# Patient Record
Sex: FEMALE | Race: WHITE | NOT HISPANIC OR LATINO | Employment: FULL TIME | URBAN - METROPOLITAN AREA
[De-identification: names, ages, dates, MRNs, and addresses within clinical notes are randomized per-mention and may not be internally consistent; named-entity substitution may affect disease eponyms.]

---

## 2018-07-05 ENCOUNTER — OFFICE VISIT (OUTPATIENT)
Dept: FAMILY MEDICINE CLINIC | Facility: CLINIC | Age: 48
End: 2018-07-05
Payer: COMMERCIAL

## 2018-07-05 VITALS
HEIGHT: 63 IN | SYSTOLIC BLOOD PRESSURE: 110 MMHG | HEART RATE: 80 BPM | BODY MASS INDEX: 31.06 KG/M2 | WEIGHT: 175.3 LBS | RESPIRATION RATE: 18 BRPM | DIASTOLIC BLOOD PRESSURE: 80 MMHG

## 2018-07-05 DIAGNOSIS — M25.532 ACUTE PAIN OF LEFT WRIST: Primary | ICD-10-CM

## 2018-07-05 DIAGNOSIS — Z91.030 BEE STING ALLERGY: ICD-10-CM

## 2018-07-05 DIAGNOSIS — N95.1 MENOPAUSAL AND FEMALE CLIMACTERIC STATES: ICD-10-CM

## 2018-07-05 DIAGNOSIS — H00.015 HORDEOLUM EXTERNUM LEFT LOWER EYELID: ICD-10-CM

## 2018-07-05 PROCEDURE — 99213 OFFICE O/P EST LOW 20 MIN: CPT | Performed by: NURSE PRACTITIONER

## 2018-07-05 RX ORDER — EPINEPHRINE 0.3 MG/.3ML
0.3 INJECTION SUBCUTANEOUS ONCE
Qty: 0.3 ML | Refills: 0 | Status: SHIPPED | OUTPATIENT
Start: 2018-07-05 | End: 2018-07-09 | Stop reason: SDUPTHER

## 2018-07-05 RX ORDER — VARENICLINE TARTRATE 25 MG
KIT ORAL
COMMUNITY
Start: 2014-09-15

## 2018-07-05 RX ORDER — ONDANSETRON 8 MG/1
1 TABLET, ORALLY DISINTEGRATING ORAL EVERY 8 HOURS PRN
COMMUNITY
Start: 2015-01-21 | End: 2018-07-05

## 2018-07-05 RX ORDER — LORAZEPAM 0.5 MG/1
TABLET ORAL
COMMUNITY
Start: 2018-06-07 | End: 2018-07-05

## 2018-07-05 RX ORDER — ERYTHROMYCIN 5 MG/G
OINTMENT OPHTHALMIC
COMMUNITY
Start: 2015-06-08 | End: 2018-07-05

## 2018-07-05 RX ORDER — LORAZEPAM 0.5 MG/1
TABLET ORAL
COMMUNITY
Start: 2016-02-11 | End: 2018-07-05

## 2018-07-05 RX ORDER — ZOLPIDEM TARTRATE 5 MG/1
TABLET ORAL
COMMUNITY
Start: 2016-02-11 | End: 2019-04-24 | Stop reason: SDUPTHER

## 2018-07-05 RX ORDER — VARENICLINE TARTRATE 1 MG/1
TABLET, FILM COATED ORAL
COMMUNITY
Start: 2014-08-15 | End: 2018-07-05

## 2018-07-05 NOTE — LETTER
July 5, 2018     Patient: Ericka Hayes   YOB: 1970   Date of Visit: 7/5/2018       To Whom it May Concern:    Jamaica Moran is under my professional care  She was seen in my office on 7/5/2018  Please allow patient to work from home on Friday 7/6/18   If you have any questions or concerns, please don't hesitate to call           Sincerely,          Dearl BRITTANY Holland        CC: No Recipients

## 2018-07-05 NOTE — PATIENT INSTRUCTIONS
Stye   WHAT YOU NEED TO KNOW:   A stye is a lump on the edge or inside of your eyelid caused by an infection  A stye can form on your upper or lower eyelid  It usually goes away in 2 to 4 days  DISCHARGE INSTRUCTIONS:   Medicines:   · Antibiotic medicine: This is given as an ointment to put into your eye  It is used to fight an infection caused by bacteria  Use as directed  · Take your medicine as directed  Contact your healthcare provider if you think your medicine is not helping or if you have side effects  Tell him of her if you are allergic to any medicine  Keep a list of the medicines, vitamins, and herbs you take  Include the amounts, and when and why you take them  Bring the list or the pill bottles to follow-up visits  Carry your medicine list with you in case of an emergency  Follow up with your healthcare provider as directed:  Write down your questions so you remember to ask them during your visits  Self-care:   · Use warm compresses: This will help decrease swelling and pain  Wet a clean washcloth with warm water and place it on your eye for 10 to 15 minutes, 3 to 4 times each day or as directed  · Keep your hands away from your eye: This helps to prevent the spread of the infection to other parts of the eye  Wash your hands often with soap and dry with a clean towel  Do not squeeze the stye  · Do not use eye makeup:  Do not wear eye makeup while you have a stye  Eye makeup may carry bacteria and cause another stye  Throw away eye makeup and brushes used to apply the makeup  Use new eye makeup after the stye has gone away  Do not share eye makeup with others  · Prevent another stye:  Wash your face and clean your eyelashes every day  Remove eye makeup with makeup remover  This helps to completely remove eye makeup without heavy rubbing  Contact your healthcare provider if:   · You have redness and discharge around your eye, and your eye pain is getting worse      · Your vision changes  · The stye has not gone away within 7 days  · The stye comes back within a short period of time after treatment  · You have questions or concerns about your condition or care  © 2017 [employer] Information is for End User's use only and may not be sold, redistributed or otherwise used for commercial purposes  All illustrations and images included in CareNotes® are the copyrighted property of [employer]  or [employer]  The above information is an  only  It is not intended as medical advice for individual conditions or treatments  Talk to your doctor, nurse or pharmacist before following any medical regimen to see if it is safe and effective for you

## 2018-07-06 ENCOUNTER — APPOINTMENT (OUTPATIENT)
Dept: LAB | Facility: CLINIC | Age: 48
End: 2018-07-06
Payer: COMMERCIAL

## 2018-07-06 ENCOUNTER — TRANSCRIBE ORDERS (OUTPATIENT)
Dept: LAB | Facility: CLINIC | Age: 48
End: 2018-07-06

## 2018-07-06 LAB — FSH SERPL-ACNC: 58.3 MIU/ML

## 2018-07-06 PROCEDURE — 36415 COLL VENOUS BLD VENIPUNCTURE: CPT | Performed by: NURSE PRACTITIONER

## 2018-07-06 PROCEDURE — 83001 ASSAY OF GONADOTROPIN (FSH): CPT | Performed by: NURSE PRACTITIONER

## 2018-07-09 DIAGNOSIS — Z91.030 BEE STING ALLERGY: ICD-10-CM

## 2018-07-09 DIAGNOSIS — H00.015 HORDEOLUM EXTERNUM LEFT LOWER EYELID: ICD-10-CM

## 2018-07-09 RX ORDER — EPINEPHRINE 0.3 MG/.3ML
0.3 INJECTION SUBCUTANEOUS ONCE
Qty: 0.3 ML | Refills: 0 | Status: SHIPPED | OUTPATIENT
Start: 2018-07-09 | End: 2018-07-10 | Stop reason: SDUPTHER

## 2018-07-10 DIAGNOSIS — H00.015 HORDEOLUM EXTERNUM LEFT LOWER EYELID: ICD-10-CM

## 2018-07-10 DIAGNOSIS — Z91.030 BEE STING ALLERGY: ICD-10-CM

## 2018-07-10 RX ORDER — EPINEPHRINE 0.3 MG/.3ML
0.3 INJECTION SUBCUTANEOUS ONCE
Qty: 0.3 ML | Refills: 0 | Status: SHIPPED | OUTPATIENT
Start: 2018-07-10 | End: 2018-07-10

## 2018-07-12 ENCOUNTER — OFFICE VISIT (OUTPATIENT)
Dept: FAMILY MEDICINE CLINIC | Facility: CLINIC | Age: 48
End: 2018-07-12
Payer: COMMERCIAL

## 2018-07-12 VITALS
RESPIRATION RATE: 18 BRPM | WEIGHT: 175.2 LBS | SYSTOLIC BLOOD PRESSURE: 110 MMHG | DIASTOLIC BLOOD PRESSURE: 70 MMHG | HEART RATE: 78 BPM | TEMPERATURE: 98 F | BODY MASS INDEX: 31.53 KG/M2

## 2018-07-12 DIAGNOSIS — J30.1 SEASONAL ALLERGIC RHINITIS DUE TO POLLEN: Primary | ICD-10-CM

## 2018-07-12 PROCEDURE — 99213 OFFICE O/P EST LOW 20 MIN: CPT | Performed by: NURSE PRACTITIONER

## 2018-07-12 RX ORDER — FLUTICASONE PROPIONATE 50 MCG
2 SPRAY, SUSPENSION (ML) NASAL DAILY
Qty: 16 G | Refills: 0 | Status: SHIPPED | OUTPATIENT
Start: 2018-07-12 | End: 2021-02-26 | Stop reason: ALTCHOICE

## 2018-07-12 RX ORDER — CETIRIZINE HYDROCHLORIDE 10 MG/1
10 TABLET ORAL DAILY
Qty: 30 TABLET | Refills: 0 | Status: SHIPPED | OUTPATIENT
Start: 2018-07-12 | End: 2021-02-26 | Stop reason: ALTCHOICE

## 2018-07-12 NOTE — PATIENT INSTRUCTIONS
Allergic Rhinitis   WHAT YOU NEED TO KNOW:   Allergic rhinitis, or hay fever, is swelling of the inside of your nose  The swelling is a reaction to allergens in the air  An allergen can be anything that causes an allergic reaction  Allergies to weeds, grass, trees, or mold often cause seasonal allergic rhinitis  Indoor dust mites, cockroaches, pet dander, or mold can also cause allergic rhinitis  DISCHARGE INSTRUCTIONS:   Call 911 for the following:   · You have chest pain or shortness of breath  Return to the emergency department if:   · You have severe pain  · You cough up blood  Contact your healthcare provider if:   · You have a fever  · You have ear or sinus pain, or a headache  · Your symptoms get worse, even after treatment  · You have yellow, green, brown, or bloody mucus coming from your nose  · Your nose is bleeding or you have pain inside your nose  · You have trouble sleeping because of your symptoms  · You have questions or concerns about your condition or care  Medicines:   · Medicines  help decrease your symptoms and clear your stuffy nose  · Take your medicine as directed  Contact your healthcare provider if you think your medicine is not helping or if you have side effects  Tell him of her if you are allergic to any medicine  Keep a list of the medicines, vitamins, and herbs you take  Include the amounts, and when and why you take them  Bring the list or the pill bottles to follow-up visits  Carry your medicine list with you in case of an emergency  How to manage allergic rhinitis:  The best way to manage allergic rhinitis is to avoid allergens that can trigger your symptoms  Any of the following may help decrease your symptoms:  · Rinse your nose and sinuses  with a salt water solution or use a salt water nasal spray  This will help thin the mucus in your nose and rinse away pollen and dirt  It will also help reduce swelling so you can breathe normally  Ask your healthcare provider how often to rinse your nose  · Reduce exposure to dust mites  Wash sheets and towels in hot water every week  Cover your pillows and mattresses with allergen-free covers  Limit the number of stuffed animals and soft toys your child has  Wash your child's toys in hot water regularly  Vacuum weekly and use a vacuum  with an air filter  If possible, get rid of carpets and curtains  These collect dust and dust mites  · Reduce exposure to pollen  Keep windows and doors closed in your house and car  Stay inside when air pollution or the pollen count is high  Run your air conditioner on recycle, and change air filters often  Shower and wash your hair before bed every night to rinse away pollen  · Reduce exposure to pet dander  If possible, do not keep cats, dogs, birds, or other pets  If you do keep pets in your home, keep them out of bedrooms and carpeted rooms  Bathe them often  · Reduce exposure to mold  Do not spend time in basements  Choose artificial plants instead of live plants  Keep your home's humidity at less than 45%  Do not have ponds or standing water in your home or yard  · Do not smoke  Avoid others who smoke  Ask your healthcare provider for information if you currently smoke and need help to quit  Follow up with your healthcare provider as directed: You may need to see an allergist often to control your symptoms  Write down your questions so you remember to ask them during your visits  © 2017 [employer] Information is for End User's use only and may not be sold, redistributed or otherwise used for commercial purposes  All illustrations and images included in CareNotes® are the copyrighted property of [employer] , [employer]  or [employer]eves  The above information is an  only  It is not intended as medical advice for individual conditions or treatments   Talk to your doctor, nurse or pharmacist before following any medical regimen to see if it is safe and effective for you

## 2018-07-12 NOTE — PROGRESS NOTES
Assessment/Plan:    No problem-specific Assessment & Plan notes found for this encounter  Diagnoses and all orders for this visit:    Seasonal allergic rhinitis due to pollen  -     fluticasone (FLONASE) 50 mcg/act nasal spray; 2 sprays into each nostril daily 1 hour prior to bedtime  -     cetirizine (ZyrTEC) 10 mg tablet; Take 1 tablet (10 mg total) by mouth daily In the am          Subjective:   Chief Complaint   Patient presents with    Cold Like Symptoms     cough        Patient ID: Adrienne fountain a 50 y o  female  Presents today with complaints of head congestion and pressure, sore throat, and generally not feeling well for 1 day  The following portions of the patient's history were reviewed and updated as appropriate: allergies, current medications, past family history, past medical history, past social history, past surgical history and problem list     Review of Systems   Constitutional: Negative for chills and fever  HENT: Positive for congestion, sinus pressure and sore throat  Negative for ear pain, facial swelling, postnasal drip, rhinorrhea, sinus pain and sneezing  Eyes: Negative for itching  Respiratory: Positive for cough (non productive )  Negative for shortness of breath and wheezing  Cardiovascular: Negative for chest pain  Psychiatric/Behavioral: Negative for dysphoric mood  The patient is not nervous/anxious  Objective:  Vitals:    07/12/18 1451   BP: 110/70   BP Location: Right arm   Patient Position: Sitting   Cuff Size: Standard   Pulse: 78   Resp: 18   Temp: 98 °F (36 7 °C)   TempSrc: Axillary   Weight: 79 5 kg (175 lb 3 2 oz)      Physical Exam   Constitutional: She is oriented to person, place, and time  She appears well-developed and well-nourished  No distress  HENT:   Right Ear: Tympanic membrane, external ear and ear canal normal    Left Ear: Tympanic membrane, external ear and ear canal normal    Nose: Mucosal edema and rhinorrhea present  Right sinus exhibits no maxillary sinus tenderness and no frontal sinus tenderness  Left sinus exhibits no maxillary sinus tenderness and no frontal sinus tenderness  Mouth/Throat: Oropharynx is clear and moist  No oropharyngeal exudate, posterior oropharyngeal edema or posterior oropharyngeal erythema  Cardiovascular: Normal rate and regular rhythm  Pulmonary/Chest: Effort normal and breath sounds normal    Neurological: She is alert and oriented to person, place, and time  Skin: Skin is warm and dry  Psychiatric: She has a normal mood and affect   Her behavior is normal

## 2018-07-19 ENCOUNTER — OFFICE VISIT (OUTPATIENT)
Dept: FAMILY MEDICINE CLINIC | Facility: CLINIC | Age: 48
End: 2018-07-19
Payer: COMMERCIAL

## 2018-07-19 VITALS
HEIGHT: 63 IN | TEMPERATURE: 98 F | WEIGHT: 172.7 LBS | HEART RATE: 90 BPM | SYSTOLIC BLOOD PRESSURE: 110 MMHG | RESPIRATION RATE: 18 BRPM | BODY MASS INDEX: 30.6 KG/M2 | DIASTOLIC BLOOD PRESSURE: 70 MMHG

## 2018-07-19 DIAGNOSIS — Z12.31 SCREENING MAMMOGRAM, ENCOUNTER FOR: ICD-10-CM

## 2018-07-19 DIAGNOSIS — Z01.419 GYNECOLOGIC EXAM NORMAL: Primary | ICD-10-CM

## 2018-07-19 DIAGNOSIS — R32 URINARY INCONTINENCE, UNSPECIFIED TYPE: ICD-10-CM

## 2018-07-19 DIAGNOSIS — Z80.3 FAMILY HISTORY OF BREAST CANCER IN FEMALE: ICD-10-CM

## 2018-07-19 DIAGNOSIS — E55.9 VITAMIN D DEFICIENCY: ICD-10-CM

## 2018-07-19 DIAGNOSIS — N90.89 LABIAL LESION: ICD-10-CM

## 2018-07-19 DIAGNOSIS — R92.2 DENSE BREAST: ICD-10-CM

## 2018-07-19 PROCEDURE — 87624 HPV HI-RISK TYP POOLED RSLT: CPT | Performed by: NURSE PRACTITIONER

## 2018-07-19 PROCEDURE — G0145 SCR C/V CYTO,THINLAYER,RESCR: HCPCS | Performed by: NURSE PRACTITIONER

## 2018-07-19 PROCEDURE — 99396 PREV VISIT EST AGE 40-64: CPT | Performed by: NURSE PRACTITIONER

## 2018-07-19 RX ORDER — LORAZEPAM 0.5 MG/1
TABLET ORAL
COMMUNITY
Start: 2018-06-07 | End: 2019-06-05 | Stop reason: SDUPTHER

## 2018-07-19 NOTE — PROGRESS NOTES
Subjective     Anny Duncan is a 50 y o   female and is here for routine health maintenance  The patient reports problems - menopausal symptoms  History of Present Illness     HPI    Well Adult Physical   Patient here for a Gynecological exam       Diet and Physical Activity  Diet: low carbohydrate diet  Weight concerns: Patient has class 1 obesity (BMI 30-34  9)  Exercise: frequently      Depression Screen  PHQ-9 Depression Screening    PHQ-9:    Frequency of the following problems over the past two weeks:                History:  LMP: @  Menses regular  hysterectomy  Cycle Length N/A  Flow N/A  Menorrhagia  no  Dysmenorrhea   no  : 4  Para: 4  Breast Fed yes  Bottle Fed yes  Both yes   Screening  Colononoscopy N/A  Mammogram ? Pap ? Abnormal pap? no      The following portions of the patient's history were reviewed and updated as appropriate: allergies, current medications, past family history, past medical history, past social history, past surgical history and problem list     Review of Systems     Review of Systems   Constitutional: Negative for chills, fatigue and fever  Cardiovascular: Negative for leg swelling  Gastrointestinal: Negative for abdominal pain  Genitourinary: Positive for dyspareunia (occasionally)  Negative for dysuria, frequency, genital sores, menstrual problem, pelvic pain, urgency, vaginal bleeding, vaginal discharge and vaginal pain  Skin: Negative for rash  Hematological: Negative for adenopathy         Breast ROS: negative for breast lumps  Self Breast Exam yes  Genito-Urinary ROS: no dysuria, trouble voiding, or hematuria  Urinary Incontinence  Yes stress  Hot Flashes yes  Night Sweats yes  Vaginal dryness Occasionally  Insomnia no  Sexually Active yes  Birth Control Method Hysterectomy  Calcium dietary source  Vitamin D no  Weight bearing Exercise yes  Past Medical History     Past Medical History:   Diagnosis Date    Anemia     Depression Past Surgical History     Past Surgical History:   Procedure Laterality Date    BACK SURGERY  2015    mulitple     SECTION      HYSTERECTOMY      SPINAL FUSION      posterior - T10 sacrum       Social History     Social History     Social History    Marital status: /Civil Union     Spouse name: N/A    Number of children: N/A    Years of education: N/A     Social History Main Topics    Smoking status: Current Every Day Smoker     Types: Cigarettes    Smokeless tobacco: Never Used      Comment: heavy tob smoker 15 cig/day as per [employer]    Alcohol use No    Drug use: No    Sexual activity: Not Asked     Other Topics Concern    None     Social History Narrative    None       Family History     Family History   Problem Relation Age of Onset    Breast cancer Mother         sternum    Diabetes Father     Hypertension Father     Coronary artery disease Father        Current Medications       Current Outpatient Prescriptions:     LORazepam (ATIVAN) 0 5 mg tablet, take 1 tablet by oral route prior to flight and repeat in 4 hours as needed, Disp: , Rfl:     cetirizine (ZyrTEC) 10 mg tablet, Take 1 tablet (10 mg total) by mouth daily In the am, Disp: 30 tablet, Rfl: 0    EPINEPHrine (EPIPEN) 0 3 mg/0 3 mL SOAJ, Inject 0 3 mL (0 3 mg total) into a muscle once for 1 dose, Disp: 0 3 mL, Rfl: 0    fluticasone (FLONASE) 50 mcg/act nasal spray, 2 sprays into each nostril daily 1 hour prior to bedtime, Disp: 16 g, Rfl: 0    gentamicin (GENTAK) 0 3 % ophthalmic ointment, Administer 0 5 inches into the left eye 3 (three) times a day, Disp: 3 5 g, Rfl: 0    varenicline (CHANTIX STARTING MONTH ) 0 5 MG X 11 & 1 MG X 42 tablet, Take by mouth, Disp: , Rfl:     zolpidem (AMBIEN) 5 mg tablet, Reported on 2017, Disp: , Rfl:      Allergies     Allergies   Allergen Reactions    Bee Venom Hives    Bee Pollen        Objective     /70 (BP Location: Left arm, Patient Position: Sitting, Cuff Size: Standard)   Pulse 90   Temp 98 °F (36 7 °C) (Temporal)   Resp 18   Ht 5' 2 5" (1 588 m)   Wt 78 3 kg (172 lb 11 2 oz)   BMI 31 08 kg/m²      Physical Exam   Constitutional: She appears well-developed and well-nourished  Neck: No thyromegaly present  Cardiovascular: Normal rate, regular rhythm and normal heart sounds  Pulmonary/Chest: Effort normal and breath sounds normal  Right breast exhibits no inverted nipple, no mass, no nipple discharge, no skin change and no tenderness  Left breast exhibits no inverted nipple, no mass, no nipple discharge, no skin change and no tenderness  Breasts are symmetrical    Abdominal: There is no tenderness  Genitourinary: Rectum normal  No breast swelling, tenderness, discharge or bleeding  No labial fusion  There is no rash, tenderness, lesion or injury on the right labia  There is lesion (mid labia nodular and white in color) on the left labia  There is no rash, tenderness or injury on the left labia  Genitourinary Comments: Vagina Atrophic  Adnexa surgically absent  Uterus surgically absent   Lymphadenopathy:     She has no cervical adenopathy  Skin: Skin is warm and dry  Psychiatric: She has a normal mood and affect  Her behavior is normal          No exam data present    Health Maintenance     Health Maintenance   Topic Date Due    HIV SCREENING  1970    Depression Screening PHQ-9  1970    MAMMOGRAM  1970    INFLUENZA VACCINE  09/01/2018    DTaP,Tdap,and Td Vaccines (3 - Td) 01/01/2024     Immunization History   Administered Date(s) Administered    Influenza 12/03/2014, 10/19/2016, 10/04/2017, 10/11/2017    Influenza Quadrivalent, 6-35 Months IM 12/03/2014    Influenza Split 10/04/2017    Tdap 05/22/2008, 01/01/2014       Assessment/Plan     Healthy well female  Waiting on PAP/HPV results  Labial Lesion left side: apply hydrocortisone twice daily for 2 weeks   If not resolved will biopsy    BRITTANY Lares

## 2018-07-23 LAB — HPV RRNA GENITAL QL NAA+PROBE: NORMAL

## 2018-07-24 LAB
LAB AP GYN PRIMARY INTERPRETATION: NORMAL
Lab: NORMAL

## 2018-08-02 ENCOUNTER — HOSPITAL ENCOUNTER (EMERGENCY)
Facility: HOSPITAL | Age: 48
Discharge: HOME | End: 2018-08-02
Attending: EMERGENCY MEDICINE
Payer: COMMERCIAL

## 2018-08-02 ENCOUNTER — APPOINTMENT (EMERGENCY)
Dept: RADIOLOGY | Facility: HOSPITAL | Age: 48
End: 2018-08-02
Payer: COMMERCIAL

## 2018-08-02 VITALS
WEIGHT: 169 LBS | HEART RATE: 81 BPM | BODY MASS INDEX: 30.42 KG/M2 | DIASTOLIC BLOOD PRESSURE: 61 MMHG | RESPIRATION RATE: 18 BRPM | OXYGEN SATURATION: 100 % | SYSTOLIC BLOOD PRESSURE: 123 MMHG

## 2018-08-02 DIAGNOSIS — S89.92XA INJURY OF LEFT KNEE, INITIAL ENCOUNTER: Primary | ICD-10-CM

## 2018-08-02 PROCEDURE — 73564 X-RAY EXAM KNEE 4 OR MORE: CPT

## 2018-08-02 PROCEDURE — 99283 EMERGENCY DEPT VISIT LOW MDM: CPT

## 2018-08-02 RX ORDER — OXYCODONE HYDROCHLORIDE 5 MG/1
5 CAPSULE ORAL EVERY 6 HOURS PRN
Qty: 10 CAPSULE | Refills: 0 | Status: SHIPPED | OUTPATIENT
Start: 2018-08-02 | End: 2021-02-26 | Stop reason: ALTCHOICE

## 2018-08-02 RX ORDER — OXYCODONE HYDROCHLORIDE 5 MG/1
5 TABLET ORAL ONCE
Status: COMPLETED | OUTPATIENT
Start: 2018-08-02 | End: 2018-08-02

## 2018-08-02 RX ADMIN — OXYCODONE HYDROCHLORIDE 5 MG: 5 TABLET ORAL at 17:48

## 2018-08-02 NOTE — ED NOTES
Taught PT how to use crutches, PT showed me appropriately that she could use them        Hill Free  08/02/18 3151

## 2018-08-02 NOTE — DISCHARGE INSTRUCTIONS
Diagnosis; left knee injury     - knee immobolizer and crutches as needed     - intermitent ice to area  For next 24 hrs- 3-5 times per day  For 10-15 minutes at a time    - for pain- would start with over the counter generic ibuprofen 400 mg- taken together with over the counter generic tylenol 500 mg taken together  4 times per day with meals/liquids    - for severe pain- oxycodone 1 tablet every 6 hrs as needed - should only need for next 2-3 days on a as needed basis    - please call  The orthopedic doctor tomorrow to schedule an appointment for a recheck - you can see any doctor in the group

## 2018-08-02 NOTE — ED PROVIDER NOTES
History  Chief Complaint   Patient presents with    Knee Injury     Pt states she was walking down the stairs and felt a pop in her left knee, had no pain now presents w/ pain and swelling      48 YR  FEMALE STEPPED AWKWARDLY WHILE WALKING YESTERDAY WITH LEFT KNEE EXTERNAL ROTATION  WITH INITIAL PAIN BUT INCREASING PAIN THRU OUT DAY  -- C/O ANT KNEE/ LATERLA KNEE LATERAL LOWER THIGH PAIN - NO OTHER COMPS        History provided by:  Patient and spouse   used: No        Prior to Admission Medications   Prescriptions Last Dose Informant Patient Reported? Taking? EPINEPHrine (EPIPEN) 0 3 mg/0 3 mL SOAJ   No No   Sig: Inject 0 3 mL (0 3 mg total) into a muscle once for 1 dose   LORazepam (ATIVAN) 0 5 mg tablet   Yes No   Sig: take 1 tablet by oral route prior to flight and repeat in 4 hours as needed   cetirizine (ZyrTEC) 10 mg tablet   No No   Sig: Take 1 tablet (10 mg total) by mouth daily In the am   fluticasone (FLONASE) 50 mcg/act nasal spray   No No   Si sprays into each nostril daily 1 hour prior to bedtime   gentamicin (GENTAK) 0 3 % ophthalmic ointment   No No   Sig: Administer 0 5 inches into the left eye 3 (three) times a day   varenicline (CHANTIX STARTING MONTH ) 0 5 MG X 11 & 1 MG X 42 tablet   Yes No   Sig: Take by mouth   zolpidem (AMBIEN) 5 mg tablet   Yes No   Sig: Reported on 2017      Facility-Administered Medications: None       Past Medical History:   Diagnosis Date    Anemia     Depression        Past Surgical History:   Procedure Laterality Date    BACK SURGERY  2015    mulitple     SECTION      HYSTERECTOMY      SPINAL FUSION      posterior - T10 sacrum       Family History   Problem Relation Age of Onset    Breast cancer Mother         sternum    Diabetes Father     Hypertension Father     Coronary artery disease Father      I have reviewed and agree with the history as documented      Social History   Substance Use Topics    Smoking status: Current Every Day Smoker     Types: Cigarettes    Smokeless tobacco: Never Used      Comment: heavy tob smoker 15 cig/day as per [employer]    Alcohol use No        Review of Systems   Constitutional: Negative  HENT: Negative  Eyes: Negative  Respiratory: Negative  Cardiovascular: Negative  Gastrointestinal: Negative  Endocrine: Negative  Genitourinary: Negative  Musculoskeletal: Positive for gait problem  Negative for arthralgias, back pain, joint swelling, myalgias, neck pain and neck stiffness  LEFT KNEE PAIN    Skin: Negative  Allergic/Immunologic: Negative  Hematological: Negative  Psychiatric/Behavioral: Negative  Physical Exam  Physical Exam   Constitutional: She appears well-developed and well-nourished  No distress  VSS- PULSE  5 ON RA- INTERPRETATION IS NORMAL- NO INTERVENTION    Musculoskeletal:   LLE- NT AT PELVIS/ GROIN/ HIP AREA- LEFT KNEE- POS LATERAL LOWER THIGH PAIN -- DECREASED FLEX/EXT AT KNEE- PATELLAR- NT- MILD FIB HEAD TENDENRNESS- NO DEFINITE LIG LAXITY - NO EFFUSION - NORMAL LLE DISTAL PULSE-SENSATION STRENGTH/ROM- NORMAL ACHILLES TENDON FUCNTION   Skin: She is not diaphoretic  Nursing note and vitals reviewed  Vital Signs  ED Triage Vitals [08/02/18 1636]   Temp Pulse Respirations Blood Pressure SpO2   -- 81 18 123/61 100 %      Temp src Heart Rate Source Patient Position - Orthostatic VS BP Location FiO2 (%)   -- Monitor Sitting Left arm --      Pain Score       8           Vitals:    08/02/18 1636   BP: 123/61   Pulse: 81   Patient Position - Orthostatic VS: Sitting       Visual Acuity      ED Medications  Medications   oxyCODONE (ROXICODONE) IR tablet 5 mg (5 mg Oral Given 8/2/18 1748)       Diagnostic Studies  Results Reviewed     None                 XR knee 4+ vw left injury   Final Result by Ramon Monterroso MD (08/02 1653)      No acute osseous abnormality              Workstation performed: [id number] Procedures  Procedures       Phone Contacts  ED Phone Contact    ED Course  ED Course as of Aug 02 1813   Thu Aug 02, 2018   1745 Left knee xray - no evidence of fx                                 MDM  [facility] Time    Disposition  Final diagnoses:   None     ED Disposition     None      Follow-up Information    None         Patient's Medications   Discharge Prescriptions    No medications on file     No discharge procedures on file      ED Provider  Electronically Signed by           Carol Qiu MD  08/02/18 7668

## 2018-08-02 NOTE — ED NOTES
PT awake and alert, no distress noted  No other questions upon d/c       April Bhumi Jackson RN  08/02/18 4599

## 2019-04-22 ENCOUNTER — TELEPHONE (OUTPATIENT)
Dept: FAMILY MEDICINE CLINIC | Facility: CLINIC | Age: 49
End: 2019-04-22

## 2019-04-23 ENCOUNTER — TELEPHONE (OUTPATIENT)
Dept: FAMILY MEDICINE CLINIC | Facility: CLINIC | Age: 49
End: 2019-04-23

## 2019-04-23 DIAGNOSIS — G47.9 SLEEP DIFFICULTIES: Primary | ICD-10-CM

## 2019-04-23 RX ORDER — ZOLPIDEM TARTRATE 5 MG/1
5 TABLET ORAL AS NEEDED
Qty: 5 TABLET | Refills: 0 | Status: SHIPPED | OUTPATIENT
Start: 2019-04-23 | End: 2019-04-24 | Stop reason: SDUPTHER

## 2019-04-24 DIAGNOSIS — G47.9 SLEEP DIFFICULTIES: ICD-10-CM

## 2019-04-24 RX ORDER — ZOLPIDEM TARTRATE 5 MG/1
5 TABLET ORAL AS NEEDED
Qty: 5 TABLET | Refills: 0 | Status: SHIPPED | OUTPATIENT
Start: 2019-04-24 | End: 2019-10-14 | Stop reason: SDUPTHER

## 2019-06-05 DIAGNOSIS — G47.9 SLEEP DIFFICULTIES: Primary | ICD-10-CM

## 2019-06-05 RX ORDER — LORAZEPAM 0.5 MG/1
0.5 TABLET ORAL AS NEEDED
Qty: 5 TABLET | Refills: 0 | Status: SHIPPED | OUTPATIENT
Start: 2019-06-05 | End: 2019-06-06 | Stop reason: SDUPTHER

## 2019-06-06 ENCOUNTER — TELEPHONE (OUTPATIENT)
Dept: FAMILY MEDICINE CLINIC | Facility: CLINIC | Age: 49
End: 2019-06-06

## 2019-06-06 DIAGNOSIS — G47.9 SLEEP DIFFICULTIES: ICD-10-CM

## 2019-06-06 RX ORDER — LORAZEPAM 0.5 MG/1
0.5 TABLET ORAL AS NEEDED
Qty: 5 TABLET | Refills: 0 | Status: SHIPPED | OUTPATIENT
Start: 2019-06-06 | End: 2019-10-14 | Stop reason: SDUPTHER

## 2019-08-12 ENCOUNTER — TELEPHONE (OUTPATIENT)
Dept: FAMILY MEDICINE CLINIC | Facility: CLINIC | Age: 49
End: 2019-08-12

## 2019-08-12 ENCOUNTER — OFFICE VISIT (OUTPATIENT)
Dept: FAMILY MEDICINE CLINIC | Facility: CLINIC | Age: 49
End: 2019-08-12
Payer: COMMERCIAL

## 2019-08-12 VITALS
HEART RATE: 94 BPM | OXYGEN SATURATION: 94 % | DIASTOLIC BLOOD PRESSURE: 72 MMHG | SYSTOLIC BLOOD PRESSURE: 126 MMHG | BODY MASS INDEX: 29.19 KG/M2 | TEMPERATURE: 97.9 F | WEIGHT: 162.2 LBS

## 2019-08-12 DIAGNOSIS — L23.7 ALLERGIC CONTACT DERMATITIS DUE TO PLANTS, EXCEPT FOOD: Primary | ICD-10-CM

## 2019-08-12 PROCEDURE — 99214 OFFICE O/P EST MOD 30 MIN: CPT | Performed by: NURSE PRACTITIONER

## 2019-08-12 NOTE — PROGRESS NOTES
Subjective:   Chief Complaint   Patient presents with    Rash     Since Saturday        Patient ID: Ericka Hayes is a 52 y o  female  Presents today for complaints of itching red rash around eyes also on the top of her feet since Saturday  On Friday night Saturday morning they were cutting down trees on her property so she was around a great deal of brush  Itching began after she showered on Saturday afternoon and she has been using apple cider vinegar to stop the itch but nothing else  The following portions of the patient's history were reviewed and updated as appropriate: allergies, current medications, past family history, past medical history, past social history, past surgical history and problem list     Review of Systems   Constitutional: Negative for chills, fatigue and fever  Eyes: Negative for pain and visual disturbance  Respiratory: Negative for cough, shortness of breath and wheezing  Cardiovascular: Negative for chest pain, palpitations and leg swelling  Skin: Positive for rash  Psychiatric/Behavioral: Negative for dysphoric mood  The patient is not nervous/anxious  Objective:  Vitals:    08/12/19 1344   BP: 126/72   BP Location: Left arm   Patient Position: Sitting   Cuff Size: Adult   Pulse: 94   Temp: 97 9 °F (36 6 °C)   TempSrc: Temporal   SpO2: 94%   Weight: 73 6 kg (162 lb 3 2 oz)      Physical Exam   Constitutional: She appears well-developed and well-nourished  Eyes: Pupils are equal, round, and reactive to light  Conjunctivae and EOM are normal  Right eye exhibits no discharge  Left eye exhibits no discharge  Neck: Normal range of motion  Neck supple  Cardiovascular: Normal rate, regular rhythm, normal heart sounds and intact distal pulses  Pulmonary/Chest: Effort normal and breath sounds normal    Skin: Rash noted  Rash is macular  Rash is not vesicular  There is erythema           Assessment/Plan:    No problem-specific Assessment & Plan notes found for this encounter  Diagnoses and all orders for this visit:    Allergic contact dermatitis due to plants, except food  Comments:  Symptom relief to include benadryl as needed and cold compresses  Orders:  -     hydrocortisone 2 5 % ointment;  Apply topically 2 (two) times a day

## 2019-08-12 NOTE — PATIENT INSTRUCTIONS
Contact Dermatitis   AMBULATORY CARE:   Contact dermatitis  is a rash  It develops when you touch something that irritates your skin or causes an allergic reaction  Common signs and symptoms include the following:   · Red, swollen, painful rash           · Skin that itches, stings, or burns    · Dry, scaly, or crusty skin patches    · Bumps or blisters    · Fluid draining from blisters  Call 911 for any of the following:   · You have sudden trouble breathing  · Your throat swells and you have trouble eating  · Your face is swollen  Contact your healthcare provider if:   · You have a fever  · Your blisters are draining pus  · Your rash spreads or does not get better, even after treatment  · You have questions or concerns about your condition or care  Treatment for contact dermatitis  involves removing any irritants or allergens that cause your rash  You may also need medicines to decrease itching and swelling  They will be given as a topical medicine to apply to your rash or as a pill  Manage contact dermatitis:   · Take short baths or showers in cool water  Use mild soap or soap-free cleansers  Add oatmeal, baking soda, or cornstarch to the bath water to help decrease skin irritation  · Avoid skin irritants , such as makeup, hair products, soaps, and cleansers  Use products that do not contain perfume or dye  · Apply a cool compress to your rash  This will help soothe your skin  · Keep your skin moist   Rub unscented cream or lotion on your skin to prevent dryness and itching  Do this right after a bath or shower when your skin is still damp  Follow up with your healthcare provider as directed:  Write down your questions so you remember to ask them during your visits  © 2017 [employer] Information is for End User's use only and may not be sold, redistributed or otherwise used for commercial purposes   All illustrations and images included in CareNotes® are the copyrighted property of [employer]  or Lakhwinder Esteves  The above information is an  only  It is not intended as medical advice for individual conditions or treatments  Talk to your doctor, nurse or pharmacist before following any medical regimen to see if it is safe and effective for you

## 2019-08-12 NOTE — TELEPHONE ENCOUNTER
Pt called stating she has poison ivy around her eyes and chin  She is looking for an appointment for today  She had this since Friday

## 2019-10-12 DIAGNOSIS — G47.9 SLEEP DIFFICULTIES: ICD-10-CM

## 2019-10-12 RX ORDER — ZOLPIDEM TARTRATE 5 MG/1
5 TABLET ORAL AS NEEDED
Qty: 5 TABLET | Refills: 0 | Status: CANCELLED | OUTPATIENT
Start: 2019-10-12

## 2019-10-12 NOTE — TELEPHONE ENCOUNTER
I will be traveling on Tuesday by plane  If I could please have a prescription called into the [facility] in [place], I would appreciate it   Thanks

## 2019-10-14 DIAGNOSIS — G47.9 SLEEP DIFFICULTIES: ICD-10-CM

## 2019-10-14 RX ORDER — LORAZEPAM 0.5 MG/1
0.5 TABLET ORAL AS NEEDED
Qty: 5 TABLET | Refills: 0 | Status: SHIPPED | OUTPATIENT
Start: 2019-10-14 | End: 2021-02-26 | Stop reason: ALTCHOICE

## 2019-10-14 RX ORDER — ZOLPIDEM TARTRATE 5 MG/1
5 TABLET ORAL AS NEEDED
Qty: 5 TABLET | Refills: 0 | Status: SHIPPED | OUTPATIENT
Start: 2019-10-14 | End: 2021-02-26 | Stop reason: ALTCHOICE

## 2020-04-16 ENCOUNTER — TELEPHONE (OUTPATIENT)
Dept: FAMILY MEDICINE CLINIC | Facility: CLINIC | Age: 50
End: 2020-04-16

## 2020-04-23 ENCOUNTER — TELEPHONE (OUTPATIENT)
Dept: FAMILY MEDICINE CLINIC | Facility: CLINIC | Age: 50
End: 2020-04-23

## 2020-08-03 ENCOUNTER — OFFICE VISIT (OUTPATIENT)
Dept: FAMILY MEDICINE CLINIC | Facility: CLINIC | Age: 50
End: 2020-08-03
Payer: COMMERCIAL

## 2020-08-03 VITALS
RESPIRATION RATE: 16 BRPM | WEIGHT: 153.5 LBS | HEIGHT: 63 IN | HEART RATE: 90 BPM | OXYGEN SATURATION: 96 % | BODY MASS INDEX: 27.2 KG/M2 | SYSTOLIC BLOOD PRESSURE: 118 MMHG | TEMPERATURE: 97.8 F | DIASTOLIC BLOOD PRESSURE: 60 MMHG

## 2020-08-03 DIAGNOSIS — L23.7 POISON IVY DERMATITIS: Primary | ICD-10-CM

## 2020-08-03 PROCEDURE — 3008F BODY MASS INDEX DOCD: CPT | Performed by: NURSE PRACTITIONER

## 2020-08-03 PROCEDURE — 3725F SCREEN DEPRESSION PERFORMED: CPT | Performed by: NURSE PRACTITIONER

## 2020-08-03 PROCEDURE — 99214 OFFICE O/P EST MOD 30 MIN: CPT | Performed by: NURSE PRACTITIONER

## 2020-08-03 RX ORDER — PREDNISONE 20 MG/1
20 TABLET ORAL DAILY
Qty: 5 TABLET | Refills: 0 | Status: SHIPPED | OUTPATIENT
Start: 2020-08-03 | End: 2020-08-08

## 2020-08-03 NOTE — PATIENT INSTRUCTIONS
Poison Ivy   WHAT YOU NEED TO KNOW:   Poison ivy is a plant that can cause an itchy, uncomfortable rash on your skin  Poison ivy grows as a shrub or vine in woods, fields, and areas of thick [place]  It has 3 bright green leaves on each stem that turn red in terrance  DISCHARGE INSTRUCTIONS:   Medicines:   · Antiseptic or drying creams or ointments: These medicines may be used to dry out the rash and decrease the itching  These products may be available without a doctor's order  · Steroids: This medicine helps decrease itching and inflammation  It can be given as a cream to apply to your skin or as a pill  · Antihistamines: This medicine may help decrease itching and help you sleep  It is available without a doctor's order  · Take your medicine as directed  Contact your healthcare provider if you think your medicine is not helping or if you have side effects  Tell him of her if you are allergic to any medicine  Keep a list of the medicines, vitamins, and herbs you take  Include the amounts, and when and why you take them  Bring the list or the pill bottles to follow-up visits  Carry your medicine list with you in case of an emergency  Follow up with your healthcare provider as directed:  Write down your questions so you remember to ask them during your visits  How your poison ivy rash spreads: You cannot spread poison ivy by touching your rash or the liquid from your blisters  Poison ivy is spread only if you scratch your skin while it still has oil on it  You may think your rash is spreading because new rashes appear over a number of days  This happens because areas covered by thin skin break out in a rash first  Your face or forearms may develop a rash before thicker areas, such as the palms of your hands  Self-care:   · Keep your rash clean and dry:  Wash it with soap and water  Gently pat it dry with a clean towel  · Try not to scratch or rub your rash:   This can cause your skin to become infected  · Use a compress on your rash:  Dip a clean washcloth in cool water  Wring it out and place it on your rash  Leave the washcloth on your skin for 15 minutes  Do this at least 3 times per day  · Take a cornstarch or oatmeal bath: If your rash is too large to cover with wet washcloths, take 3 or 4 cornstarch baths daily  Mix 1 pound of cornstarch with a little water to make a paste  Add the paste to a tub full of water and mix well  You may also use colloidal oatmeal in the bath water  Use lukewarm water  Avoid hot water because it may cause your itching to increase  Prevent a poison ivy rash in the future:   · Wear skin protection:  Wear long pants, a long-sleeved shirt, and gloves  Use a skin block lotion to protect your skin from poison ivy oil  You can find this at a drugstore without a prescription  · Wash clothing after possible exposure: If you think you have been near a poison ivy plant, wash the clothes you were wearing separately from other clothes  Rinse the washing machine well after you take the clothes out  Scrub boots and shoes with warm, soapy water  Dry clean items and clothing that you cannot wash in water  Poison ivy oil is sticky and can stay on surfaces for a long time  It can cause a new rash even years later  · Bathe your pet:  Use warm water and shampoo on your pet's fur  This will prevent the spread of oil to your skin, car, and home  Wear long sleeves, long pants, and gloves while washing pets or any items that may have oil on them  · Reduce exposure to poison ivy:  Do not touch plants that look like poison ivy  Keep your yard free of poison ivy  While protecting your skin, remove the plant and the roots  Place them in a plastic bag and seal the bag tightly  · Do not burn poison ivy plants: This can spread the oil through the air  If you breathe the oil into your lungs, you could have swelling and serious breathing problems   Oil that clings to the fire otto can land on your skin and cause a rash  Contact your healthcare provider if:   · You have pus, soft yellow scabs, or tenderness on the rash  · The itching gets worse or keeps you awake at night  · The rash covers more than 1/4 of your skin or spreads to your eyes, mouth, or genital area  · The rash is not better after 2 to 3 weeks  · You have tender, swollen glands on the sides of your neck  · You have swelling in your arms and legs  · You have questions or concerns about your condition or care  Return to the emergency department if:   · You have a fever  · You have redness, swelling, and tenderness around the rash  · You have trouble breathing  © 2017 [facility] Information is for End User's use only and may not be sold, redistributed or otherwise used for commercial purposes  All illustrations and images included in CareNotes® are the copyrighted property of [employer]  or Lakhwinder Esteves  The above information is an  only  It is not intended as medical advice for individual conditions or treatments  Talk to your doctor, nurse or pharmacist before following any medical regimen to see if it is safe and effective for you

## 2020-08-03 NOTE — PROGRESS NOTES
Subjective:   No chief complaint on file  Patient ID: Venita Justice is a 48 y o  female  Presents today for complaints of a poison ivy rash since Friday July 31st   She was out in the garden it began on the side of her face and has now she has patches on her abdomen, hands, chin legs and thigh  She has been doing Benadryl as well as hydrocortisone over-the-counter without relief      The following portions of the patient's history were reviewed and updated as appropriate: allergies, current medications, past family history, past medical history, past social history, past surgical history and problem list     Review of Systems   Constitutional: Negative for chills, fatigue and fever  Respiratory: Negative for cough and shortness of breath  Cardiovascular: Negative for chest pain, palpitations and leg swelling  Skin: Positive for rash (posion ivy)  Psychiatric/Behavioral: Negative for dysphoric mood  The patient is not nervous/anxious  Objective:  Vitals:    08/03/20 1606   BP: 118/60   BP Location: Left arm   Patient Position: Sitting   Cuff Size: Adult   Pulse: 90   Resp: 16   Temp: 97 8 °F (36 6 °C)   TempSrc: Tympanic   SpO2: 96%   Weight: 69 6 kg (153 lb 8 oz)   Height: 5' 2 5"      Physical Exam   Constitutional: She is oriented to person, place, and time  Eyes: Pupils are equal, round, and reactive to light  Conjunctivae are normal    Cardiovascular: Normal rate, regular rhythm, normal heart sounds and normal pulses  Pulmonary/Chest: Effort normal and breath sounds normal  She has no wheezes  Abdominal: Normal appearance  Neurological: She is alert and oriented to person, place, and time  Skin: Rash noted  Rash is vesicular  Poison ivy rash on face, chest, left arm, left thigh and abdomen   Psychiatric: Her behavior is normal  Mood normal          Assessment/Plan:    No problem-specific Assessment & Plan notes found for this encounter         Diagnoses and all orders for this visit:    Poison ivy dermatitis  Comments:  Continue with hydrocortisone ointment as needed  Orders:  -     predniSONE 20 mg tablet;  Take 1 tablet (20 mg total) by mouth daily for 5 days

## 2020-08-15 ENCOUNTER — HOSPITAL ENCOUNTER (EMERGENCY)
Facility: HOSPITAL | Age: 50
Discharge: HOME | End: 2020-08-15
Attending: EMERGENCY MEDICINE | Admitting: EMERGENCY MEDICINE
Payer: COMMERCIAL

## 2020-08-15 ENCOUNTER — NURSE TRIAGE (OUTPATIENT)
Dept: OTHER | Facility: OTHER | Age: 50
End: 2020-08-15

## 2020-08-15 VITALS
DIASTOLIC BLOOD PRESSURE: 53 MMHG | TEMPERATURE: 98.6 F | OXYGEN SATURATION: 94 % | SYSTOLIC BLOOD PRESSURE: 115 MMHG | WEIGHT: 155.65 LBS | HEART RATE: 86 BPM | RESPIRATION RATE: 18 BRPM | BODY MASS INDEX: 28.01 KG/M2

## 2020-08-15 DIAGNOSIS — T78.40XA ALLERGIC REACTION, INITIAL ENCOUNTER: Primary | ICD-10-CM

## 2020-08-15 PROCEDURE — 99283 EMERGENCY DEPT VISIT LOW MDM: CPT

## 2020-08-15 PROCEDURE — 99284 EMERGENCY DEPT VISIT MOD MDM: CPT | Performed by: EMERGENCY MEDICINE

## 2020-08-15 RX ORDER — EPINEPHRINE 0.3 MG/.3ML
0.3 INJECTION SUBCUTANEOUS ONCE
Qty: 0.6 ML | Refills: 0 | Status: SHIPPED | OUTPATIENT
Start: 2020-08-15 | End: 2022-07-13 | Stop reason: SDUPTHER

## 2020-08-15 RX ORDER — FAMOTIDINE 20 MG/1
20 TABLET, FILM COATED ORAL ONCE
Status: COMPLETED | OUTPATIENT
Start: 2020-08-15 | End: 2020-08-15

## 2020-08-15 RX ORDER — FAMOTIDINE 20 MG/1
20 TABLET, FILM COATED ORAL 2 TIMES DAILY
Qty: 30 TABLET | Refills: 0 | Status: SHIPPED | OUTPATIENT
Start: 2020-08-15 | End: 2021-02-26 | Stop reason: ALTCHOICE

## 2020-08-15 RX ORDER — PREDNISONE 20 MG/1
60 TABLET ORAL ONCE
Status: COMPLETED | OUTPATIENT
Start: 2020-08-15 | End: 2020-08-15

## 2020-08-15 RX ORDER — PREDNISONE 10 MG/1
10 TABLET ORAL DAILY
Qty: 63 TABLET | Refills: 0 | Status: SHIPPED | OUTPATIENT
Start: 2020-08-15 | End: 2021-02-26 | Stop reason: ALTCHOICE

## 2020-08-15 RX ADMIN — FAMOTIDINE 20 MG: 20 TABLET, FILM COATED ORAL at 21:45

## 2020-08-15 RX ADMIN — PREDNISONE 60 MG: 20 TABLET ORAL at 21:45

## 2020-08-15 NOTE — TELEPHONE ENCOUNTER
Regarding: EpiPen Needed - Bee Sting  ----- Message from Edwar Dominguez sent at 8/15/2020  6:47 PM EDT -----  "I just got stung by a pee and needed to use an EpiPen   I think I need another one to take down the rest of the swelling "

## 2020-08-15 NOTE — TELEPHONE ENCOUNTER
Reason for Disposition   [1] Gave epinephrine shot AND [2] no symptoms now    Answer Assessment - Initial Assessment Questions  1  TYPE: "What type of sting was it?" (bee, yellow jacket, etc )       Bee  2  ONSET: "When did it occur?"       30 minutes ago  3  LOCATION: "Where is the sting located?"  "How many stings?"      Left Foot  4  SWELLING SIZE: "How big is the swelling?" (e g , inches or cm)      Double the size of the right foot  5  REDNESS: "Is the area red or pink?" If so, ask "What size is area of redness?" (e g , inches or cm)  "When did the redness start?"      Pink  6  PAIN: "Is there any pain?" If so, ask: "How bad is it?"  (Scale 1-10; or mild, moderate, severe)      Moderate  7  ITCHING: "Is there any itching?" If so, ask: "How bad is it?"       N/A  8  RESPIRATORY DISTRESS: "Describe your breathing "      No  9  PRIOR REACTIONS: "Have you had any severe allergic reactions to stings in the past?" if yes, ask: "What happened?"      Yes hives  10  OTHER SYMPTOMS: "Do you have any other symptoms?" (e g , abdominal pain, face or tongue swelling, new rash elsewhere, vomiting)        No  11   PREGNANCY: "Is there any chance you are pregnant?" "When was your last menstrual period?"        N/A    Protocols used: BEE OR YELLOW JACKET STING-ADULT-
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
## 2020-09-04 ENCOUNTER — TELEPHONE (OUTPATIENT)
Dept: FAMILY MEDICINE CLINIC | Facility: CLINIC | Age: 50
End: 2020-09-04

## 2020-09-04 DIAGNOSIS — Z98.1 S/P SPINAL FUSION: Primary | ICD-10-CM

## 2020-09-04 RX ORDER — AMOXICILLIN 500 MG/1
CAPSULE ORAL
Qty: 4 CAPSULE | Refills: 0 | Status: SHIPPED | OUTPATIENT
Start: 2020-09-04 | End: 2020-09-07

## 2020-09-04 NOTE — TELEPHONE ENCOUNTER
Patient is calling because she needs antibiotics before any dental procedure  She is having dental work Tuesday, requesting a prescription be sent to [facility] [place]  Please advise  Patient would like a call back to confirm it was sent

## 2020-09-04 NOTE — TELEPHONE ENCOUNTER
Patient said she takes usually takes 4 pills an hour before the procedure, she thinks it's amoxicillin, because of her spinal fusion  Her Dr  at [facility] said it needs to be every time she has dental work but they can't call it in because they are in [place]

## 2020-09-04 NOTE — TELEPHONE ENCOUNTER
I see not record of us doing this before    Why does she need it? What condition? Does she usually take Penicillin?

## 2021-01-18 RX ORDER — TAMSULOSIN HYDROCHLORIDE 0.4 MG/1
0.4 CAPSULE ORAL
COMMUNITY
Start: 2020-05-20 | End: 2021-05-20

## 2021-01-18 RX ORDER — KETOROLAC TROMETHAMINE 10 MG/1
10 TABLET, FILM COATED ORAL EVERY 6 HOURS PRN
COMMUNITY
Start: 2020-04-28 | End: 2021-04-28

## 2021-02-17 ENCOUNTER — TELEPHONE (OUTPATIENT)
Dept: FAMILY MEDICINE CLINIC | Facility: CLINIC | Age: 51
End: 2021-02-17

## 2021-02-26 ENCOUNTER — OFFICE VISIT (OUTPATIENT)
Dept: FAMILY MEDICINE CLINIC | Facility: CLINIC | Age: 51
End: 2021-02-26
Payer: COMMERCIAL

## 2021-02-26 VITALS
HEART RATE: 105 BPM | HEIGHT: 63 IN | TEMPERATURE: 97.3 F | DIASTOLIC BLOOD PRESSURE: 80 MMHG | WEIGHT: 167.6 LBS | BODY MASS INDEX: 29.7 KG/M2 | SYSTOLIC BLOOD PRESSURE: 118 MMHG | OXYGEN SATURATION: 97 %

## 2021-02-26 DIAGNOSIS — Z00.00 ANNUAL PHYSICAL EXAM: Primary | ICD-10-CM

## 2021-02-26 DIAGNOSIS — Z11.4 SCREENING FOR HIV (HUMAN IMMUNODEFICIENCY VIRUS): ICD-10-CM

## 2021-02-26 DIAGNOSIS — M65.341 TRIGGER RING FINGER OF RIGHT HAND: ICD-10-CM

## 2021-02-26 DIAGNOSIS — M77.8 TENDONITIS OF ELBOW, RIGHT: ICD-10-CM

## 2021-02-26 DIAGNOSIS — Z12.31 ENCOUNTER FOR SCREENING MAMMOGRAM FOR MALIGNANT NEOPLASM OF BREAST: ICD-10-CM

## 2021-02-26 DIAGNOSIS — Z23 ENCOUNTER FOR IMMUNIZATION: ICD-10-CM

## 2021-02-26 DIAGNOSIS — Z12.11 SCREENING FOR COLORECTAL CANCER: ICD-10-CM

## 2021-02-26 DIAGNOSIS — Z12.12 SCREENING FOR COLORECTAL CANCER: ICD-10-CM

## 2021-02-26 DIAGNOSIS — G47.00 INSOMNIA, UNSPECIFIED TYPE: ICD-10-CM

## 2021-02-26 LAB
ALBUMIN SERPL BCP-MCNC: 4.1 G/DL (ref 3.5–5)
ALP SERPL-CCNC: 73 U/L (ref 46–116)
ALT SERPL W P-5'-P-CCNC: 30 U/L (ref 12–78)
ANION GAP SERPL CALCULATED.3IONS-SCNC: 5 MMOL/L (ref 4–13)
AST SERPL W P-5'-P-CCNC: 13 U/L (ref 5–45)
BASOPHILS # BLD AUTO: 0.09 THOUSANDS/ΜL (ref 0–0.1)
BASOPHILS NFR BLD AUTO: 1 % (ref 0–1)
BILIRUB SERPL-MCNC: 0.5 MG/DL (ref 0.2–1)
BUN SERPL-MCNC: 15 MG/DL (ref 5–25)
CALCIUM SERPL-MCNC: 10.2 MG/DL (ref 8.3–10.1)
CHLORIDE SERPL-SCNC: 111 MMOL/L (ref 100–108)
CHOLEST SERPL-MCNC: 254 MG/DL (ref 50–200)
CO2 SERPL-SCNC: 26 MMOL/L (ref 21–32)
CREAT SERPL-MCNC: 0.58 MG/DL (ref 0.6–1.3)
EOSINOPHIL # BLD AUTO: 0.18 THOUSAND/ΜL (ref 0–0.61)
EOSINOPHIL NFR BLD AUTO: 2 % (ref 0–6)
ERYTHROCYTE [DISTWIDTH] IN BLOOD BY AUTOMATED COUNT: 12.1 % (ref 11.6–15.1)
GFR SERPL CREATININE-BSD FRML MDRD: 108 ML/MIN/1.73SQ M
GLUCOSE P FAST SERPL-MCNC: 85 MG/DL (ref 65–99)
HCT VFR BLD AUTO: 49.5 % (ref 34.8–46.1)
HDLC SERPL-MCNC: 54 MG/DL
HGB BLD-MCNC: 16.4 G/DL (ref 11.5–15.4)
IMM GRANULOCYTES # BLD AUTO: 0.03 THOUSAND/UL (ref 0–0.2)
IMM GRANULOCYTES NFR BLD AUTO: 0 % (ref 0–2)
LDLC SERPL CALC-MCNC: 169 MG/DL (ref 0–100)
LYMPHOCYTES # BLD AUTO: 2.45 THOUSANDS/ΜL (ref 0.6–4.47)
LYMPHOCYTES NFR BLD AUTO: 29 % (ref 14–44)
MCH RBC QN AUTO: 30.8 PG (ref 26.8–34.3)
MCHC RBC AUTO-ENTMCNC: 33.1 G/DL (ref 31.4–37.4)
MCV RBC AUTO: 93 FL (ref 82–98)
MONOCYTES # BLD AUTO: 0.66 THOUSAND/ΜL (ref 0.17–1.22)
MONOCYTES NFR BLD AUTO: 8 % (ref 4–12)
NEUTROPHILS # BLD AUTO: 5.06 THOUSANDS/ΜL (ref 1.85–7.62)
NEUTS SEG NFR BLD AUTO: 60 % (ref 43–75)
NONHDLC SERPL-MCNC: 200 MG/DL
NRBC BLD AUTO-RTO: 0 /100 WBCS
PLATELET # BLD AUTO: 281 THOUSANDS/UL (ref 149–390)
PMV BLD AUTO: 10.2 FL (ref 8.9–12.7)
POTASSIUM SERPL-SCNC: 4.1 MMOL/L (ref 3.5–5.3)
PROT SERPL-MCNC: 7.8 G/DL (ref 6.4–8.2)
RBC # BLD AUTO: 5.32 MILLION/UL (ref 3.81–5.12)
SODIUM SERPL-SCNC: 142 MMOL/L (ref 136–145)
TRIGL SERPL-MCNC: 156 MG/DL
TSH SERPL DL<=0.05 MIU/L-ACNC: 0.97 UIU/ML (ref 0.36–3.74)
WBC # BLD AUTO: 8.47 THOUSAND/UL (ref 4.31–10.16)

## 2021-02-26 PROCEDURE — 80053 COMPREHEN METABOLIC PANEL: CPT | Performed by: NURSE PRACTITIONER

## 2021-02-26 PROCEDURE — 99396 PREV VISIT EST AGE 40-64: CPT | Performed by: NURSE PRACTITIONER

## 2021-02-26 PROCEDURE — 87389 HIV-1 AG W/HIV-1&-2 AB AG IA: CPT | Performed by: NURSE PRACTITIONER

## 2021-02-26 PROCEDURE — 36415 COLL VENOUS BLD VENIPUNCTURE: CPT | Performed by: NURSE PRACTITIONER

## 2021-02-26 PROCEDURE — 85025 COMPLETE CBC W/AUTO DIFF WBC: CPT | Performed by: NURSE PRACTITIONER

## 2021-02-26 PROCEDURE — 84443 ASSAY THYROID STIM HORMONE: CPT | Performed by: NURSE PRACTITIONER

## 2021-02-26 PROCEDURE — 80061 LIPID PANEL: CPT | Performed by: NURSE PRACTITIONER

## 2021-02-26 PROCEDURE — 3725F SCREEN DEPRESSION PERFORMED: CPT | Performed by: NURSE PRACTITIONER

## 2021-02-26 RX ORDER — TRAZODONE HYDROCHLORIDE 50 MG/1
50 TABLET ORAL
Qty: 30 TABLET | Refills: 1 | Status: SHIPPED | OUTPATIENT
Start: 2021-02-26 | End: 2021-04-26

## 2021-02-26 RX ORDER — NAPROXEN 500 MG/1
500 TABLET ORAL 2 TIMES DAILY WITH MEALS
Qty: 20 TABLET | Refills: 0 | Status: SHIPPED | OUTPATIENT
Start: 2021-02-26

## 2021-02-26 NOTE — PATIENT INSTRUCTIONS

## 2021-02-26 NOTE — PROGRESS NOTES
BMI Counseling: Body mass index is 30 17 kg/m²  The BMI is above normal  Nutrition recommendations include reducing portion sizes, decreasing overall calorie intake, 3-5 servings of fruits/vegetables daily, reducing fast food intake, consuming healthier snacks, decreasing soda and/or juice intake, moderation in carbohydrate intake, increasing intake of lean protein, reducing intake of saturated fat and trans fat and reducing intake of cholesterol  Exercise recommendations include moderate aerobic physical activity for 150 minutes/week, exercising 3-5 times per week and joining a gym  [address] [employer]    NAME: David Bhandari  AGE: 48 y o  SEX: female  : 1970     DATE: 2021     Assessment and Plan:     Problem List Items Addressed This Visit     None      Visit Diagnoses     Annual physical exam    -  Primary    Relevant Orders    CBC and differential    Comprehensive metabolic panel    TSH, 3rd generation with Free T4 reflex    Lipid panel    Encounter for immunization        Screening for HIV (human immunodeficiency virus)        Relevant Orders    HIV 1/2 Antigen/Antibody (4th Generation) w Reflex [facility]    Screening for colorectal cancer        Relevant Orders    Ambulatory referral to Gastroenterology    Encounter for screening mammogram for malignant neoplasm of breast        Relevant Orders    Mammo screening bilateral w 3d & cad    BMI 30 0-30 9,adult        Trigger ring finger of right hand        Tendonitis of elbow, right        Relevant Medications    naproxen (NAPROSYN) 500 mg tablet    Insomnia, unspecified type        Relevant Medications    traZODone (DESYREL) 50 mg tablet          Immunizations and preventive care screenings were discussed with patient today  Appropriate education was printed on patient's after visit summary      Counseling:  Alcohol/drug use: discussed moderation in alcohol intake, the recommendations for healthy alcohol use, and avoidance of illicit drug use  Dental Health: discussed importance of regular tooth brushing, flossing, and dental visits  Sexual health: discussed sexually transmitted diseases, partner selection, use of condoms, avoidance of unintended pregnancy, and contraceptive alternatives  · Exercise: the importance of regular exercise/physical activity was discussed  Recommend exercise 3-5 times per week for at least 30 minutes  Tobacco Cessation Counseling: Tobacco cessation counseling was not provided  The patient is sincerely urged to quit consumption of tobacco  She is not ready to quit tobacco        Return in about 1 year (around 2/26/2022), or trigger finger injection wiht Dr Aysha Holder, for Annual physical      Chief Complaint:     Chief Complaint   Patient presents with    Physical Exam    Elbow Pain     right x 1 month    Hand Pain     right ring finger, x 4months, "locking up" several times a day      History of Present Illness:     Adult Annual Physical   Patient here for a comprehensive physical exam  The patient reports problems - Pain in right hand with ring finger locking from digging  Right elbow pain and radiates down forearmand up slightly    Not sleeping well   very stressed     Issues in personal life       Diet and Physical Activity  · Diet/Nutrition: limited fruits/vegetables and not eating well    Goes most of day and eatin g at night late   · Exercise: no formal exercise  Depression Screening  PHQ-9 Depression Screening    PHQ-9:   Frequency of the following problems over the past two weeks:      Little interest or pleasure in doing things: 0 - not at all  Feeling down, depressed, or hopeless: 0 - not at all  PHQ-2 Score: 0       General Health  · Sleep: sleeps poorly  · Hearing: normal - bilateral   · Vision: no vision problems  · Dental: regular dental visits         /GYN Health  · Patient is: had hysterectomy     Having hot flashes  ·   ·   Review of Systems:     Review of Systems   Constitutional: Positive for activity change  Negative for appetite change, fatigue and fever  HENT: Negative for congestion, sinus pain, sore throat and tinnitus  Respiratory: Negative for cough and shortness of breath  Cardiovascular: Negative for chest pain  Gastrointestinal: Negative for abdominal pain, constipation, diarrhea and nausea  Genitourinary: Negative for frequency and urgency  Musculoskeletal: Negative for back pain and myalgias  Pain in right elbow and ring finger in right hand      Skin: Negative for rash  Neurological: Negative for dizziness, light-headedness, numbness and headaches  Psychiatric/Behavioral: Positive for sleep disturbance  The patient is nervous/anxious  Past Medical History:     Past Medical History:   Diagnosis Date    Anemia     Depression       Past Surgical History:     Past Surgical History:   Procedure Laterality Date    BACK SURGERY  2015    mulitple     SECTION      HYSTERECTOMY      SPINAL FUSION      posterior - T10 sacrum      Social History:     E-Cigarette/Vaping    E-Cigarette Use Never User      E-Cigarette/Vaping Substances    Nicotine No     THC No     CBD No     Flavoring No     Other No     Unknown No      Social History     Socioeconomic History    Marital status: /Civil Union     Spouse name: None    Number of children: None    Years of education: None    Highest education level: None   Occupational History    None   Social Needs    Financial resource strain: None    Food insecurity     Worry: None     Inability: None    Transportation needs     Medical: None     Non-medical: None   Tobacco Use    Smoking status: Current Every Day Smoker     Types: Cigarettes    Smokeless tobacco: Never Used    Tobacco comment: heavy tob smoker 15 cig/day as per [employer]   Substance and Sexual Activity    Alcohol use: No    Drug use:  No  Sexual activity: None   Lifestyle    Physical activity     Days per week: None     Minutes per session: None    Stress: None   Relationships    Social connections     Talks on phone: None     Gets together: None     Attends [employer] service: None     Active member of club or organization: None     Attends meetings of clubs or organizations: None     Relationship status: None    Intimate partner violence     Fear of current or ex partner: None     Emotionally abused: None     Physically abused: None     Forced sexual activity: None   Other Topics Concern    None   Social History Narrative    None      Family History:     Family History   Problem Relation Age of Onset    Breast cancer Mother         sternum    Diabetes Father     Hypertension Father     Coronary artery disease Father       Current Medications:     Current Outpatient Medications   Medication Sig Dispense Refill    EPINEPHrine (EPIPEN) 0 3 mg/0 3 mL SOAJ Inject 0 3 mL (0 3 mg total) into a muscle once for 1 dose 0 6 mL 0    erythromycin (ILOTYCIN) ophthalmic ointment Reported on 7/5/2017      ketorolac (TORADOL) 10 mg tablet Take 10 mg by mouth every 6 (six) hours as needed      naproxen (NAPROSYN) 500 mg tablet Take 1 tablet (500 mg total) by mouth 2 (two) times a day with meals 20 tablet 0    tamsulosin (FLOMAX) 0 4 mg Take 0 4 mg by mouth      traZODone (DESYREL) 50 mg tablet Take 1 tablet (50 mg total) by mouth daily at bedtime 30 tablet 1    varenicline (CHANTIX STARTING MONTH PAK) 0 5 MG X 11 & 1 MG X 42 tablet Take by mouth       No current facility-administered medications for this visit  Allergies:      Allergies   Allergen Reactions    Bee Venom Hives    Bee Pollen       Physical Exam:     /80 (BP Location: Left arm, Patient Position: Sitting, Cuff Size: Large)   Pulse 105   Temp (!) 97 3 °F (36 3 °C) (Temporal)   Ht 5' 2 5" (1 588 m)   Wt 76 kg (167 lb 9 6 oz)   SpO2 97%   BMI 30 17 kg/m²     Physical Exam  Vitals signs reviewed  Constitutional:       Appearance: Normal appearance  She is normal weight  HENT:      Right Ear: Tympanic membrane, ear canal and external ear normal       Left Ear: Tympanic membrane, ear canal and external ear normal    Eyes:      Conjunctiva/sclera: Conjunctivae normal    Neck:      Musculoskeletal: Normal range of motion and neck supple  Cardiovascular:      Rate and Rhythm: Normal rate and regular rhythm  Heart sounds: Normal heart sounds  Pulmonary:      Effort: Pulmonary effort is normal       Breath sounds: Normal breath sounds  Abdominal:      General: Bowel sounds are normal       Palpations: Abdomen is soft  Musculoskeletal: Normal range of motion  Skin:     General: Skin is warm  Capillary Refill: Capillary refill takes less than 2 seconds  Neurological:      Mental Status: She is alert and oriented to person, place, and time  Psychiatric:         Mood and Affect: Mood normal          Behavior: Behavior normal          Thought Content:  Thought content normal          Judgment: Judgment normal           BRITTANY Lindquist  [address]

## 2021-02-28 LAB — HIV 1+2 AB+HIV1 P24 AG SERPL QL IA: NORMAL

## 2021-03-01 DIAGNOSIS — Z12.11 SPECIAL SCREENING FOR MALIGNANT NEOPLASMS, COLON: Primary | ICD-10-CM

## 2021-03-01 NOTE — TELEPHONE ENCOUNTER
Pt passed crc screening  Colonoscopy scheduled 04/01/21 @[facility] with Dr Ian Spencer    Pt has fear of anesthesia

## 2021-03-02 RX ORDER — SODIUM, POTASSIUM,MAG SULFATES 17.5-3.13G
SOLUTION, RECONSTITUTED, ORAL ORAL
Qty: 2 BOTTLE | Refills: 0 | Status: SHIPPED | OUTPATIENT
Start: 2021-03-02

## 2021-03-03 ENCOUNTER — TELEPHONE (OUTPATIENT)
Dept: FAMILY MEDICINE CLINIC | Facility: CLINIC | Age: 51
End: 2021-03-03

## 2021-03-03 DIAGNOSIS — Z98.1 S/P SPINAL FUSION: Primary | ICD-10-CM

## 2021-03-03 RX ORDER — AMOXICILLIN 500 MG/1
2000 CAPSULE ORAL
Qty: 4 CAPSULE | Refills: 0 | Status: SHIPPED | OUTPATIENT
Start: 2021-03-03 | End: 2021-03-04

## 2021-03-03 NOTE — TELEPHONE ENCOUNTER
Patient calling, patient stated she discussed antibiotic for dental procedure for 3/12 appointment  Requesting it be sent to pharmacy, [facility] in chart  Please advise

## 2021-03-12 ENCOUNTER — TELEPHONE (OUTPATIENT)
Dept: FAMILY MEDICINE CLINIC | Facility: CLINIC | Age: 51
End: 2021-03-12

## 2021-03-12 ENCOUNTER — OFFICE VISIT (OUTPATIENT)
Dept: FAMILY MEDICINE CLINIC | Facility: CLINIC | Age: 51
End: 2021-03-12
Payer: COMMERCIAL

## 2021-03-12 VITALS
HEART RATE: 81 BPM | BODY MASS INDEX: 29.98 KG/M2 | DIASTOLIC BLOOD PRESSURE: 76 MMHG | RESPIRATION RATE: 18 BRPM | HEIGHT: 63 IN | OXYGEN SATURATION: 98 % | WEIGHT: 169.2 LBS | TEMPERATURE: 96.8 F | SYSTOLIC BLOOD PRESSURE: 118 MMHG

## 2021-03-12 DIAGNOSIS — M77.11 LATERAL EPICONDYLITIS OF RIGHT ELBOW: ICD-10-CM

## 2021-03-12 DIAGNOSIS — M65.341 TRIGGER FINGER, RIGHT RING FINGER: Primary | ICD-10-CM

## 2021-03-12 DIAGNOSIS — G56.01 CARPAL TUNNEL SYNDROME OF RIGHT WRIST: ICD-10-CM

## 2021-03-12 PROCEDURE — 4004F PT TOBACCO SCREEN RCVD TLK: CPT | Performed by: FAMILY MEDICINE

## 2021-03-12 PROCEDURE — 20551 NJX 1 TENDON ORIGIN/INSJ: CPT | Performed by: FAMILY MEDICINE

## 2021-03-12 PROCEDURE — 3008F BODY MASS INDEX DOCD: CPT | Performed by: FAMILY MEDICINE

## 2021-03-12 PROCEDURE — 20550 NJX 1 TENDON SHEATH/LIGAMENT: CPT | Performed by: FAMILY MEDICINE

## 2021-03-12 PROCEDURE — 99213 OFFICE O/P EST LOW 20 MIN: CPT | Performed by: FAMILY MEDICINE

## 2021-03-12 NOTE — TELEPHONE ENCOUNTER
I was checking out her  and had him sign for the cologuard and she was at the window with him and asked why she couldn't have the cologuard instead of the colonoscopy that she is scheduled for in April  I told her that I didn't know her circumstances and there was probably a reason and she told me to send you a note so she can get the cologuard instead of the colonoscopy

## 2021-03-12 NOTE — PATIENT INSTRUCTIONS
You possibly have carpal tunnel syndrome in the right wrist   Wear a wrist splint every night to bed  This should make it better

## 2021-03-12 NOTE — TELEPHONE ENCOUNTER
Advised we use the colonsocopy as preferred    He didn't want to do it   If she has appt should follow through with colonoscopy   Better and more accurate   She is being a good girl

## 2021-03-12 NOTE — PROGRESS NOTES
Assessment/Plan:    No problem-specific Assessment & Plan notes found for this encounter  Diagnoses and all orders for this visit:    Trigger finger, right ring finger    Lateral epicondylitis of right elbow          Subjective:     Chief Complaint   Patient presents with    Trigger Finger        Patient ID: Cory Sim is a 48 y o  female  For the last 2 months or more she has had pain on the lateral epicondyle and into the dorsum of the forearm  She thinks it was when her and her  were redoing the floors and she had do a lot of sweeping  She has tried a tennis elbow strap and I seeing and Naprosyn but it has not helped a whole lot  She works as a  and does a lot of typing  She has triggering of her right ring finger and that is been going on for months and gradually getting worse  She is here today for cortisone injection  She also has had symptoms of numbness and tingling in to the index and middle finger and because of the nature of her work she could have mild CTS  Tinel sign is negative today  This problem can be treated conservatively with nighttime splinting but I told her if it continues to be a problem we could do either a cortisone injection or referral to Hand surgery  : cortisone injections - right elbow and right hand  Possible CTS of the right wrist     The following portions of the patient's history were reviewed and updated as appropriate: allergies, current medications, past family history, past medical history, past social history, past surgical history and problem list     Review of Systems   Constitutional: Negative for activity change, appetite change, fatigue, fever and unexpected weight change  HENT: Negative for congestion, dental problem and sneezing  Eyes: Negative for discharge and visual disturbance  Respiratory: Negative for cough, shortness of breath and wheezing  Cardiovascular: Negative for chest pain     Gastrointestinal: Negative for abdominal pain, constipation, diarrhea, nausea and vomiting  Endocrine: Negative for polydipsia and polyuria  Genitourinary: Negative for dysuria and frequency  Musculoskeletal: Positive for arthralgias  Skin: Negative for rash  Allergic/Immunologic: Negative for environmental allergies and food allergies  Neurological: Negative for light-headedness and headaches  Hematological: Negative for adenopathy  Psychiatric/Behavioral: Negative for behavioral problems and sleep disturbance  Objective:  Vitals:    03/12/21 0759   BP: 118/76   BP Location: Left arm   Patient Position: Sitting   Cuff Size: Standard   Pulse: 81   Resp: 18   Temp: (!) 96 8 °F (36 °C)   TempSrc: Tympanic   SpO2: 98%   Weight: 76 7 kg (169 lb 3 2 oz)   Height: 5' 2 5" (1 588 m)      Physical Exam  Constitutional:       General: She is not in acute distress  Appearance: Normal appearance  She is obese  She is not ill-appearing  Musculoskeletal:         General: No swelling or deformity  Arms:    Skin:     Findings: No erythema, lesion or rash  Neurological:      General: No focal deficit present  Mental Status: She is alert and oriented to person, place, and time  Psychiatric:         Mood and Affect: Mood normal          Hand/upper extremity injection: R ring A1  Universal Protocol:  Consent: Verbal consent obtained    Risks and benefits: risks, benefits and alternatives were discussed  Consent given by: patient  Patient understanding: patient states understanding of the procedure being performed    Supporting Documentation  Indications: pain   Procedure Details  Condition:trigger finger Location: ring finger - R ring A1   Preparation: Patient was prepped and draped in the usual sterile fashion  Needle size: 27 G  Ultrasound guidance: no  Approach: volar  Medications administered: 0 5 mL lidocaine 1 %; 20 mg triamcinolone acetonide 40 mg/mL    Patient tolerance: patient tolerated the procedure well with no immediate complications  Dressing:  Sterile dressing applied     Hand/upper extremity injection: R elbow  Universal Protocol:  Consent: Verbal consent obtained  Written consent not obtained    Risks and benefits: risks, benefits and alternatives were discussed  Consent given by: patient  Patient understanding: patient states understanding of the procedure being performed  Patient identity confirmed: verbally with patient    Supporting Documentation  Indications: pain   Procedure Details  Condition:lateral epicondylitis Site: R elbow   Preparation: Patient was prepped and draped in the usual sterile fashion  Needle size: 25 G  Ultrasound guidance: no  Approach: lateral  Medications administered: 0 5 mL lidocaine 1 %; 20 mg triamcinolone acetonide 40 mg/mL    Patient tolerance: patient tolerated the procedure well with no immediate complications  Dressing:  Sterile dressing applied

## 2021-03-13 RX ORDER — LIDOCAINE HYDROCHLORIDE 10 MG/ML
0.5 INJECTION, SOLUTION INFILTRATION; PERINEURAL
Status: COMPLETED | OUTPATIENT
Start: 2021-03-13 | End: 2021-03-13

## 2021-03-13 RX ORDER — TRIAMCINOLONE ACETONIDE 40 MG/ML
20 INJECTION, SUSPENSION INTRA-ARTICULAR; INTRAMUSCULAR
Status: COMPLETED | OUTPATIENT
Start: 2021-03-13 | End: 2021-03-13

## 2021-03-13 RX ADMIN — TRIAMCINOLONE ACETONIDE 20 MG: 40 INJECTION, SUSPENSION INTRA-ARTICULAR; INTRAMUSCULAR at 11:20

## 2021-03-13 RX ADMIN — LIDOCAINE HYDROCHLORIDE 0.5 ML: 10 INJECTION, SOLUTION INFILTRATION; PERINEURAL at 11:20

## 2021-03-18 ENCOUNTER — TELEPHONE (OUTPATIENT)
Dept: GASTROENTEROLOGY | Facility: MEDICAL CENTER | Age: 51
End: 2021-03-18

## 2021-03-31 RX ORDER — SODIUM CHLORIDE 9 MG/ML
125 INJECTION, SOLUTION INTRAVENOUS CONTINUOUS
Status: CANCELLED | OUTPATIENT
Start: 2021-03-31

## 2021-04-01 ENCOUNTER — HOSPITAL ENCOUNTER (OUTPATIENT)
Dept: GASTROENTEROLOGY | Facility: MEDICAL CENTER | Age: 51
Setting detail: OUTPATIENT SURGERY
Discharge: HOME | End: 2021-04-01
Attending: INTERNAL MEDICINE

## 2021-04-21 ENCOUNTER — TELEPHONE (OUTPATIENT)
Dept: FAMILY MEDICINE CLINIC | Facility: CLINIC | Age: 51
End: 2021-04-21

## 2021-04-26 DIAGNOSIS — G47.00 INSOMNIA, UNSPECIFIED TYPE: ICD-10-CM

## 2021-04-26 RX ORDER — TRAZODONE HYDROCHLORIDE 50 MG/1
TABLET ORAL
Qty: 30 TABLET | Refills: 1 | Status: SHIPPED | OUTPATIENT
Start: 2021-04-26 | End: 2021-07-12 | Stop reason: SDUPTHER

## 2021-07-10 DIAGNOSIS — G47.00 INSOMNIA, UNSPECIFIED TYPE: ICD-10-CM

## 2021-07-12 RX ORDER — TRAZODONE HYDROCHLORIDE 50 MG/1
TABLET ORAL
Qty: 30 TABLET | Refills: 1 | Status: SHIPPED | OUTPATIENT
Start: 2021-07-12 | End: 2021-09-07

## 2021-10-01 DIAGNOSIS — G47.00 INSOMNIA, UNSPECIFIED TYPE: ICD-10-CM

## 2021-10-04 RX ORDER — TRAZODONE HYDROCHLORIDE 50 MG/1
TABLET ORAL
Qty: 30 TABLET | Refills: 0 | Status: SHIPPED | OUTPATIENT
Start: 2021-10-04 | End: 2021-11-01

## 2022-01-01 NOTE — PROGRESS NOTES
Assessment/Plan:    No problem-specific Assessment & Plan notes found for this encounter  There are no diagnoses linked to this encounter  Subjective:   Chief Complaint   Patient presents with    Stye     left eye        Patient ID: Ervin Blevins is a 52 y o  female  Presents today with a 2 day history of left eye stye on the lower lid, and right wrist and thumb pain for 1 day  Denies any injury to the thumb  She has tried warm compresses and tea bags to the left eye without resolve  The following portions of the patient's history were reviewed and updated as appropriate: allergies, current medications, past family history, past medical history, past social history, past surgical history and problem list     Review of Systems   Constitutional: Negative for chills and fever  Eyes: Positive for pain (left eye stye)  Respiratory: Negative for cough  Cardiovascular: Negative for chest pain  Musculoskeletal: Positive for joint swelling (right wrist thumb pain)  Objective:  Vitals:    07/05/18 1336   BP: 110/80   BP Location: Left arm   Patient Position: Sitting   Cuff Size: Standard   Pulse: 80   Resp: 18   Weight: 79 5 kg (175 lb 4 8 oz)   Height: 5' 2 5" (1 588 m)      Physical Exam   Constitutional: She appears well-developed and well-nourished  Eyes: Conjunctivae and EOM are normal  Pupils are equal, round, and reactive to light  Emergency Contact Information:  Wayne Henry Parent   [phone]   Elkin Rooney Mother   [phone]     A [employer] phone  was utilized to facilitate this clinic visit.     Referred Here:  Primary Care Provider: Dr. Adair Bill  Cardiologist: Dr. Andrey Cali    Reason for Visit:  Cameron Bueno is a 7 week old female who presents today for a pre-op H & P.  Pre-Op diagnosis: ventricular septal defect, cleft mitral valve who has had poor feeding and reduced growth velocity  Planned procedure and date: closure of VSD, PFO and possible mitral valve repair via sternotomy on May 6th, 2022 with Dr. Waller Said  Anesthesia concerns: Trisomy 21    PMH:  Birth history: Born at 37 5/7 weeks gestation. Birth weight: 3.01 kg. Hospitalized at [facility] NICU for congenital heart disease and respiratory distress requiring CPAP. Pregnancy was complicated by inadequately treated maternal GBS+.      Cardiac history: Prenatal diagnosis. Post  confirmation of large perimembranous VSD with inlet extension  Recent medical history: No recent cough, fever, rhinorrhea, vomiting, diarrhea or diarrhea. Patient has been experiencing constipation.    HPI:  Cameron has been experiencing increased work of breathing, especially with feeding. She is taking longer than previously to drink a bottle. Mom reports that she offers 2 oz of Similac Advance fortified to 24 KCal about every 2-3 hours. Cameron does not always finish the bottle.     ROS:  General: Positive for trisomy 21.  Dermatologic: Positive for dry skin.  Cardiovascular: Negative.  Respiratory: Negative.  GI: Positive for recent weight loss, longer feeding time. Hyperbilirubinemia after birth, requiring phototherapy  : Negative.  Neuro: Positive for trisomy 21, developmental delay, low tone.  Endo: Positive for elevated TSH with normal or elevated free T4. No current thyroid replacement  HEENT: Negative.  Ortho: Negative.  Heme: Negative.    Cardiac  "Diagnoses:  1. Trisomy 21   2. Large perimembranous ventricular septal defect with inlet muscular extension.    3. Cleft mitral valve with mild regurgitation.      Previous Cardiac Surgeries/ Catheterizations: None     Non-cardiac PMHx:   1. Born at 37w5d BW 3.01 kg   2. NICU course was complicated by Trisomy 21, VSD, hyperbilirubinemia needing phototherapy. She was discharged on 2022 weighing 3.36 kg.   3. Elevated direct bilirubin - Group B strep infection may have originally caused the elevation, with slow resolution due to slow clearance of delta-bilirubin. Per GI, repeat bilirubin in 3-4 weeks, if WNL then stop UDCA      Surgical history:    No previous surgery.    Family Hx:    7 yo brother with TAPVR s/p  repair via median sternotomy ([facility]) followed by Dr. Wood  No family history of diabetes      Personal Hx:  Patient lives with both parents, 9 siblings and does not attend day care.   Tobacco use/exposure: No  Diet: similac 60 ml offered every 2 hours    Allergies:  Allergies as of 2022     (No Known Allergies)       Current Meds:  Reviewed current medication list with patient's parents.  Current Outpatient Medications   Medication Sig Dispense Refill     cholecalciferol (D-VI-SOL, VITAMIN D3) 10 mcg/mL (400 units/mL) LIQD liquid Take 0.5 mLs (5 mcg) by mouth daily 20 mL 0     furosemide (LASIX) 10 MG/ML solution Take 0.4 mLs (4 mg) by mouth 3 times daily 25 mL 1     ursodiol (ACTIGALL) 20 mg/mL suspension Take 2 mLs (40 mg) by mouth every 12 hours 120 mL 0     Aspirin/NSAID use in the past ten days: no.    Immunizations:  Immunizations are currently up-to-date per patient is un-immunized due to age.    Vitals Signs:  Vitals:    22 1014   BP: (!) 73/43   BP Location: Right arm   Patient Position: Supine   Cuff Size: Infant   Pulse: 136   Resp: (!) 43   SpO2: 93%   Weight: 3.75 kg (8 lb 4.3 oz)   Height: 0.57 m (1' 10.44\")   Last pain scale rating:     Physical " Exam:  General appearance: typical Trisomy 21 facies  Skin: no rashes.  Head: normocephalic, atraumatic.  Eyes: PERRLA.  Ears: small canals, TM exam limited by canals, no visible erythema  Nose and Sinuses: no rhinorrhea.  Mouth: no thrush seen.   Throat: no erythema or exudate.  Neck: supple.  Lungs: transmitted upper airway sounds, no crackles or wheezes, mild tachypnea without retractions.  Heart: S1, S2 with 4/6 systolic murmur at the LUSB, extremeties warm and well-perfused, radial pulses + and dorsalis pedis pulses +.  Abdomen: soft and non-tender. Liver 2 cm below the costal margin  Musculoskeletal: muscle strength symmetrical.  Lymphatics: no lymph adenopathy.  Neurological: anterior fontanelle soft and flat.  Other findings/diagnoses: .    Previous Tests:  Echo (2022): Patient with Trisomy 21. Large perimembranous ventricular septal defect with some inlet muscular extension, low-velocity mostly left-to-right shunt. There is no arterial level shunting. PFO with left-to-right flow. Possible cleft in the anterior leaflet of the mitral valve, with mild regurgitation. No left heart enlargement. The left and right ventricles have normal chamber size, wall thickness, and systolic function. No pericardial effusion.      Results:  CBC RESULTS: Recent Labs   Lab Test 05/03/22  1125   WBC 7.0   RBC 3.81   HGB 11.9   HCT 34.5   MCV 91*   MCH 31.2*   MCHC 34.5   RDW 14.9        Last Comprehensive Metabolic Panel:  Sodium   Date Value Ref Range Status   2022 137 133 - 143 mmol/L Final     Potassium   Date Value Ref Range Status   2022 4.4 3.2 - 6.0 mmol/L Final     Chloride   Date Value Ref Range Status   2022 101 96 - 110 mmol/L Final     Carbon Dioxide (CO2)   Date Value Ref Range Status   2022 28 17 - 29 mmol/L Final     Anion Gap   Date Value Ref Range Status   2022 8 3 - 14 mmol/L Final     Glucose   Date Value Ref Range Status   2022 101 (H) 51 - 99 mg/dL Final      Urea Nitrogen   Date Value Ref Range Status   2022 18 (H) 3 - 17 mg/dL Final     Creatinine   Date Value Ref Range Status   2022 0.36 0.15 - 0.53 mg/dL Final     GFR Estimate   Date Value Ref Range Status   2022   Final     Comment:     GFR not calculated, patient <18 years old.  Effective December 21, 2021 eGFRcr in adults is calculated using the 2021 CKD-EPI creatinine equation which includes age and gender (Levi et al., [place], DOI: 10.1056/DINFds9151704)     Calcium   Date Value Ref Range Status   2022 9.9 8.5 - 10.7 mg/dL Final       TSH   Date Value Ref Range Status   2022 12.62 (H) 0.50 - 6.00 mU/L Final     Free T4   Date Value Ref Range Status   2022 1.79 (H) 0.76 - 1.46 ng/dL Final     ECG 2022: preliminary result    Normal sinus rhythm, right axis deviation, bi-ventricular hypertrophy  Rate 140 bpm, KY interval 142 ms, QRS 54 ms    Chest Xray 2022                                                                  IMPRESSION: No infiltrate, pleural effusion or pneumothorax. Peribronchial cuffing, likely due to reactive airway disease or viral pneumonia. Prominent cardiac silhouette.    Echo 2022    Patient with Trisomy 21. Large perimembranous ventricular septal defect with some inlet muscular extension and partial coverage by the tricuspid valve. There is left to right flow across the VSD with a peak gradient of 25-35 mm Hg. Mild left atrial and ventricular enlargement. Normal right and left ventricular systolic function. Trivial mitral valve insufficiency. There is a patent foramen ovale with left to right flow.      Counseling/Education:  Discussed NPO instructions and planned procedure and anticipated course with patient/family, answering all questions. Surgeon to obtain informed consent. Do not give ASA/Ibuprofen. Call if the child develops fever or other illness.     A and P:  Cameron is a 7 week old infant with Trisomy 21, large perimembranous VSD  and cleft mitral valve who presents today for pre-op H & P. No apparent acute illness, medically clear for surgery. Her TSH is elevated today, but with elevated Free T4. These labs are consistent with recent levels, and Cameron is not on thyroid replacement at this time. Will continue to follow levels, and discuss endocrinology referral post operatively. Surgical plan for closure of VSD, PFO, and possible mitral valve repair via sternotomy on May 6th, 2022 with Dr. Waller Said.

## 2022-07-07 ENCOUNTER — OFFICE VISIT (OUTPATIENT)
Dept: FAMILY MEDICINE CLINIC | Facility: CLINIC | Age: 52
End: 2022-07-07
Payer: COMMERCIAL

## 2022-07-07 ENCOUNTER — TELEPHONE (OUTPATIENT)
Dept: ADMINISTRATIVE | Facility: OTHER | Age: 52
End: 2022-07-07

## 2022-07-07 VITALS
RESPIRATION RATE: 16 BRPM | TEMPERATURE: 97.9 F | HEIGHT: 62 IN | BODY MASS INDEX: 30.29 KG/M2 | OXYGEN SATURATION: 98 % | HEART RATE: 85 BPM | DIASTOLIC BLOOD PRESSURE: 84 MMHG | SYSTOLIC BLOOD PRESSURE: 152 MMHG | WEIGHT: 164.6 LBS

## 2022-07-07 DIAGNOSIS — F32.2 SEVERE DEPRESSION (HCC): Primary | ICD-10-CM

## 2022-07-07 DIAGNOSIS — F41.1 GENERALIZED ANXIETY DISORDER: ICD-10-CM

## 2022-07-07 DIAGNOSIS — Z12.11 SCREENING FOR MALIGNANT NEOPLASM OF COLON: ICD-10-CM

## 2022-07-07 DIAGNOSIS — Z11.59 NEED FOR HEPATITIS C SCREENING TEST: ICD-10-CM

## 2022-07-07 PROCEDURE — 99214 OFFICE O/P EST MOD 30 MIN: CPT | Performed by: NURSE PRACTITIONER

## 2022-07-07 RX ORDER — BUPROPION HYDROCHLORIDE 150 MG/1
150 TABLET ORAL EVERY MORNING
Qty: 90 TABLET | Refills: 3 | Status: SHIPPED | OUTPATIENT
Start: 2022-07-07

## 2022-07-07 RX ORDER — HYDROXYZINE HYDROCHLORIDE 25 MG/1
25 TABLET, FILM COATED ORAL EVERY 6 HOURS PRN
Qty: 90 TABLET | Refills: 1 | Status: SHIPPED | OUTPATIENT
Start: 2022-07-07 | End: 2022-08-12

## 2022-07-07 NOTE — TELEPHONE ENCOUNTER
----- Message from Susan Chaidez sent at 7/7/2022  7:06 AM EDT -----  Regarding: Care Gap Request  07/07/22 7:06 AM    Hello, our patient attached above has had Mammogram completed/performed  Please assist in updating the patient chart by pulling the Care Everywhere (CE) document  The date of service is 01/11/22       Thank you,  Lei Villar MA  [address] [facility] [place]

## 2022-07-07 NOTE — PROGRESS NOTES
Subjective:   Chief Complaint   Patient presents with    Annual Exam     Finger tip numb, right finger hard to bend  Patient ID: Tali Guadalupe is a 46 y o  female  Patient states ongoing depression for 3 years following compliacations between herself and her ex-  She reports difficuties sleeping, eating, fousing, and arising family issues  PHQ 2/9= 22, SERGIO-7= 20  She has been seeing a therapist for for the last 6 months as needed and states that she has recently switched to a new therapist  Patient reports a history of suicidal ideations, but denies any at this time  She reports her boyfriend, sister, and children as her support system  Patient states she was on a medication, unsure of the same of it, but it was making her feel empty and she had the medication discontinued  She reports an unhealthy diet rashad to her work life, is smoking twice as much, and occasionally vapes marijuana  Denies alcohol use at this time  Patient also adds that she would like to quit smoking  Patient counseled to continue meeting with therapist in addition to starting Wellbutrin and hydroxyzine  Patient informed to follow up in two weeks  More than half of this 20 minute visit spent counseling and coordinating care  The following portions of the patient's history were reviewed and updated as appropriate: allergies, current medications, past family history, past medical history, past social history, past surgical history and problem list     Review of Systems   HENT: Positive for congestion  Respiratory: Negative for chest tightness and shortness of breath  Cardiovascular: Negative for chest pain and palpitations  Psychiatric/Behavioral: Positive for dysphoric mood  Negative for hallucinations  The patient is nervous/anxious                Objective:  Vitals:    07/07/22 1404   BP: 152/84   BP Location: Left arm   Patient Position: Sitting   Cuff Size: Adult   Pulse: 85   Resp: 16   Temp: 97 9 °F (36 6 °C) TempSrc: Tympanic   SpO2: 98%   Weight: 74 7 kg (164 lb 9 6 oz)   Height: 5' 2" (1 575 m)      Physical Exam  Vitals reviewed  Constitutional:       Appearance: Normal appearance  Cardiovascular:      Rate and Rhythm: Normal rate and regular rhythm  Pulses: Normal pulses  Heart sounds: Normal heart sounds  Pulmonary:      Effort: Pulmonary effort is normal  No respiratory distress  Breath sounds: Normal breath sounds  Skin:     General: Skin is warm and dry  Capillary Refill: Capillary refill takes less than 2 seconds  Neurological:      Mental Status: She is alert  Psychiatric:         Attention and Perception: She does not perceive auditory or visual hallucinations  Mood and Affect: Mood is anxious and depressed  Speech: Speech normal            Assessment/Plan:    No problem-specific Assessment & Plan notes found for this encounter  Diagnoses and all orders for this visit:    Severe depression ([address] )  -     buPROPion (Wellbutrin XL) 150 mg 24 hr tablet; Take 1 tablet (150 mg total) by mouth every morning    Generalized anxiety disorder  -     hydrOXYzine HCL (ATARAX) 25 mg tablet; Take 1 tablet (25 mg total) by mouth every 6 (six) hours as needed for anxiety    Need for hepatitis C screening test  -     Hepatitis C Antibody ([facility]);  Future    Screening for malignant neoplasm of colon  -     Cologuard

## 2022-07-21 ENCOUNTER — OFFICE VISIT (OUTPATIENT)
Dept: FAMILY MEDICINE CLINIC | Facility: CLINIC | Age: 52
End: 2022-07-21
Payer: COMMERCIAL

## 2022-07-21 VITALS
SYSTOLIC BLOOD PRESSURE: 120 MMHG | TEMPERATURE: 97.8 F | OXYGEN SATURATION: 94 % | RESPIRATION RATE: 18 BRPM | WEIGHT: 165.6 LBS | HEIGHT: 62 IN | BODY MASS INDEX: 30.47 KG/M2 | DIASTOLIC BLOOD PRESSURE: 82 MMHG | HEART RATE: 87 BPM

## 2022-07-21 DIAGNOSIS — Z00.00 ANNUAL PHYSICAL EXAM: Primary | ICD-10-CM

## 2022-07-21 DIAGNOSIS — E55.9 VITAMIN D DEFICIENCY: ICD-10-CM

## 2022-07-21 PROCEDURE — 99396 PREV VISIT EST AGE 40-64: CPT | Performed by: NURSE PRACTITIONER

## 2022-07-21 PROCEDURE — 3725F SCREEN DEPRESSION PERFORMED: CPT | Performed by: NURSE PRACTITIONER

## 2022-07-21 RX ORDER — AMOXICILLIN 500 MG/1
CAPSULE ORAL
COMMUNITY
Start: 2022-07-13

## 2022-07-21 NOTE — PROGRESS NOTES
Leslie Liu is a 46 y o   female and is here for routine health maintenance  The patient reports problems - numbness in bilateral pointer fingers   History of Present Illness     HPI    Well Adult Physical   Patient here for a comprehensive physical exam       Diet and Physical Activity  Diet: poor diet  Weight concerns: Patient has class 1 obesity (BMI 30-34  9)  Exercise: rarely      Depression Screen  PHQ-2/9 Depression Screening    Little interest or pleasure in doing things: 0 - not at all  Feeling down, depressed, or hopeless: 1 - several days  PHQ-2 Score: 1  PHQ-2 Interpretation: Negative depression screen          General Health  Hearing: Normal:  bilateral  Vision: no vision problems, goes for regular eye exams, most recent eye exam >1 year, wears glasses and wears contacts  Dental: regular dental visits, no dental visits for >1 year, brushes teeth twice daily and flosses teeth occasionally     History:  LMP: No LMP recorded  Patient has had a hysterectomy  Menopause at 52 years  : 4  Para: 4    Cancer Screening  Colononoscopy Cologuard at home  Mammogram    Pap 2018  Abnormal pap? no  Smoker yes Annual screening with low-dose helical computed tomography (CT) for patients age 54 to 76 years with history of smoking at least 30 pack-years and, if a former smoker, had quit within the previous 15 years        The following portions of the patient's history were reviewed and updated as appropriate: allergies, current medications, past family history, past medical history, past social history, past surgical history and problem list     Review of Systems     Review of Systems   Constitutional: Negative for chills, fatigue and fever  HENT: Positive for congestion  Negative for postnasal drip, rhinorrhea and sore throat  Eyes: Negative for pain, discharge and itching  Respiratory: Negative for chest tightness and shortness of breath      Cardiovascular: Negative for chest pain and palpitations  Gastrointestinal: Negative for constipation, diarrhea, nausea and vomiting  Endocrine: Negative for cold intolerance and heat intolerance  Genitourinary: Negative for difficulty urinating, hematuria, vaginal bleeding, vaginal discharge and vaginal pain  Musculoskeletal: Negative for back pain and gait problem  Neurological: Negative for dizziness and light-headedness  Hematological: Does not bruise/bleed easily  Psychiatric/Behavioral: Positive for dysphoric mood (treated)  The patient is nervous/anxious (treated)          Past Medical History     Past Medical History:   Diagnosis Date    Anemia     Depression        Past Surgical History     Past Surgical History:   Procedure Laterality Date    BACK SURGERY  2015    mulitple     SECTION      HYSTERECTOMY      SPINAL FUSION      posterior - T10 sacrum       Social History     Social History     Socioeconomic History    Marital status:      Spouse name: None    Number of children: None    Years of education: None    Highest education level: None   Occupational History    None   Tobacco Use    Smoking status: Current Every Day Smoker     Packs/day: 1 00     Types: Cigarettes    Smokeless tobacco: Never Used    Tobacco comment: heavy tob smoker 15 cig/day as per [employer]   Vaping Use    Vaping Use: Never used   Substance and Sexual Activity    Alcohol use: No    Drug use: No    Sexual activity: None   Other Topics Concern    None   Social History Narrative    None     Social Determinants of Health     Financial Resource Strain: Not on file   Food Insecurity: Not on file   Transportation Needs: Not on file   Physical Activity: Not on file   Stress: Not on file   Social Connections: Not on file   Intimate Partner Violence: Not on file   Housing Stability: Not on file       Family History     Family History   Problem Relation Age of Onset    Breast cancer Mother         sternum    Diabetes Father     Hypertension Father     Coronary artery disease Father        Current Medications       Current Outpatient Medications:     amoxicillin (AMOXIL) 500 mg capsule, TAKE 1 CAPSULE (500 MG TOTAL) BY MOUTH 1 (ONE) TIME FOR 1 DOSE HOUR PRIOR TO DENTAL PROCEDURE, Disp: , Rfl:     buPROPion (Wellbutrin XL) 150 mg 24 hr tablet, Take 1 tablet (150 mg total) by mouth every morning, Disp: 90 tablet, Rfl: 3    hydrOXYzine HCL (ATARAX) 25 mg tablet, Take 1 tablet (25 mg total) by mouth every 6 (six) hours as needed for anxiety, Disp: 90 tablet, Rfl: 1    EPINEPHrine (EPIPEN) 0 3 mg/0 3 mL SOAJ, Inject 0 3 mL (0 3 mg total) into a muscle once for 1 dose, Disp: 0 6 mL, Rfl: 0    erythromycin (ILOTYCIN) ophthalmic ointment, Reported on 7/5/2017 (Patient not taking: No sig reported), Disp: , Rfl:     ketorolac (TORADOL) 10 mg tablet, Take 10 mg by mouth every 6 (six) hours as needed, Disp: , Rfl:     Na Sulfate-K Sulfate-Mg Sulf (Suprep Bowel Prep Kit) 17 5-3 13-1 6 GM/177ML SOLN, Take as directed by office (Patient not taking: No sig reported), Disp: 2 Bottle, Rfl: 0    naproxen (NAPROSYN) 500 mg tablet, Take 1 tablet (500 mg total) by mouth 2 (two) times a day with meals (Patient not taking: No sig reported), Disp: 20 tablet, Rfl: 0    tamsulosin (FLOMAX) 0 4 mg, Take 0 4 mg by mouth, Disp: , Rfl:     traZODone (DESYREL) 50 mg tablet, TAKE 1 TABLET BY MOUTH EVERYDAY AT BEDTIME (Patient not taking: No sig reported), Disp: 30 tablet, Rfl: 2    varenicline (CHANTIX JEN) 0 5 MG X 11 & 1 MG X 42 tablet, Take by mouth (Patient not taking: No sig reported), Disp: , Rfl:      Allergies     Allergies   Allergen Reactions    Bee Venom Hives    Bee Pollen        Objective     /82 (BP Location: Left arm, Patient Position: Sitting, Cuff Size: Large)   Pulse 87   Temp 97 8 °F (36 6 °C) (Temporal)   Resp 18   Ht 5' 2" (1 575 m)   Wt 75 1 kg (165 lb 9 6 oz)   SpO2 94%   BMI 30 29 kg/m²   Wt Readings from Last 3 Encounters:   07/21/22 75 1 kg (165 lb 9 6 oz)   07/07/22 74 7 kg (164 lb 9 6 oz)   03/12/21 76 7 kg (169 lb 3 2 oz)     BP Readings from Last 3 Encounters:   07/21/22 120/82   07/07/22 152/84   03/12/21 118/76     Pulse Readings from Last 3 Encounters:   07/21/22 87   07/07/22 85   03/12/21 81     Body mass index is 30 29 kg/m²  Physical Exam  Vitals reviewed  Constitutional:       Appearance: Normal appearance  She is well-developed  She is obese  HENT:      Head: Normocephalic  Right Ear: Tympanic membrane, ear canal and external ear normal       Left Ear: Tympanic membrane, ear canal and external ear normal       Nose: Nose normal       Mouth/Throat:      Mouth: Mucous membranes are moist       Pharynx: Oropharynx is clear  Eyes:      Extraocular Movements: Extraocular movements intact  Conjunctiva/sclera: Conjunctivae normal       Pupils: Pupils are equal, round, and reactive to light  Neck:      Thyroid: No thyromegaly  Cardiovascular:      Rate and Rhythm: Normal rate and regular rhythm  Pulses: Normal pulses  Heart sounds: Normal heart sounds  Pulmonary:      Effort: Pulmonary effort is normal       Breath sounds: Normal breath sounds  Abdominal:      General: Bowel sounds are normal  There is no distension  Palpations: Abdomen is soft  There is no mass  Tenderness: There is no abdominal tenderness  There is no right CVA tenderness, left CVA tenderness, guarding or rebound  Hernia: No hernia is present  Musculoskeletal:         General: Normal range of motion  Cervical back: Normal range of motion and neck supple  Lymphadenopathy:      Cervical: No cervical adenopathy  Skin:     General: Skin is warm and dry  Capillary Refill: Capillary refill takes less than 2 seconds  Neurological:      Mental Status: She is alert and oriented to person, place, and time  Deep Tendon Reflexes: Reflexes are normal and symmetric     Psychiatric: Mood and Affect: Mood normal          Behavior: Behavior normal            No exam data present    Health Maintenance     Health Maintenance   Topic Date Due    Hepatitis C Screening  Never done    Colorectal Cancer Screening  Never done    COVID-19 Vaccine (4 - Booster for Pfizer series) 04/09/2022    Influenza Vaccine (1) 09/01/2022    Pneumococcal Vaccine: Pediatrics (0 to 5 Years) and At-Risk Patients (6 to 59 Years) (1 - PCV) 07/07/2026 (Originally 7/11/1976)    Breast Cancer Screening: Mammogram  01/11/2023    Cervical Cancer Screening  07/19/2023    Depression Screening  07/21/2023    BMI: Followup Plan  07/21/2023    BMI: Adult  07/21/2023    Annual Physical  07/21/2023    DTaP,Tdap,and Td Vaccines (3 - Td or Tdap) 01/01/2024    HIV Screening  Completed    HIB Vaccine  Aged Out    Hepatitis B Vaccine  Aged Out    IPV Vaccine  Aged Out    Hepatitis A Vaccine  Aged Out    Meningococcal ACWY Vaccine  Aged Out    HPV Vaccine  Aged Out     Immunization History   Administered Date(s) Administered    COVID-19 PFIZER VACCINE 0 3 ML IM 03/30/2021, 04/20/2021, 12/09/2021    INFLUENZA 12/03/2014, 10/19/2016, 10/04/2017, 10/04/2017    Influenza Quadrivalent 3 years and older 10/15/2020    Influenza Quadrivalent, 6-35 Months IM 12/03/2014    Influenza Split 10/04/2017    Tdap 05/22/2008, 01/01/2014         Laboratory Results:   Lab Results   Component Value Date    WBC 8 47 02/26/2021    HGB 16 4 (H) 02/26/2021    HCT 49 5 (H) 02/26/2021    MCV 93 02/26/2021     02/26/2021     Lab Results   Component Value Date    BUN 15 02/26/2021     Lab Results   Component Value Date    ALT 30 02/26/2021    AST 13 02/26/2021     No results found for: TSH  No results found for: A1C    Lipid Profile:   No results found for: CHOL  Lab Results   Component Value Date    HDL 54 02/26/2021     No results found for: LDLC  Lab Results   Component Value Date    LDLCALC 169 (H) 02/26/2021     Lab Results Component Value Date    TRIG 156 (H) 02/26/2021                 Assessment/Plan     1  Annual physical exam  -     CBC and differential; Future  -     Comprehensive metabolic panel; Future  -     Lipid panel; Future    2  Vitamin D deficiency  -     Vitamin D 25 hydroxy; Future    3  BMI 30 0-30 9,adult  -     TSH, 3rd generation with Free T4 reflex; Future          1  Healthy female exam   2  Patient Counseling:   · Nutrition: Stressed importance of a well balanced diet, moderation of sodium/saturated fat, caloric balance and sufficient intake of fiber  · Exercise: Stressed the importance of regular exercise with a goal of 150 minutes per week  · Dental Health: Discussed daily flossing and brushing and regular dental visits   · Sexuality: Discussed sexually transmitted infections, use of condoms and prevention of unintended pregnancy  · Alcohol Use:  Recommended moderation of alcohol intake  · Injury Prevention: Discussed Safety Belts, Safety Helmets, and Smoke Detectors    · Immunizations reviewed  yes  · Discussed benefits of screening yes  · Discussed the patient's BMI with her  The BMI is above average; BMI management plan is completed  3  Cancer Screeningdone  4  Labs done  5  Follow up next physical in 1 year      BRITTANY Mata

## 2022-08-12 DIAGNOSIS — F41.1 GENERALIZED ANXIETY DISORDER: ICD-10-CM

## 2022-08-12 RX ORDER — HYDROXYZINE HYDROCHLORIDE 25 MG/1
TABLET, FILM COATED ORAL
Qty: 90 TABLET | Refills: 1 | Status: SHIPPED | OUTPATIENT
Start: 2022-08-12

## 2022-09-26 NOTE — PATIENT INSTRUCTIONS
Relaxation and Meditation   WHAT YOU NEED TO KNOW:   What do I need to know about relaxation and meditation? Relaxation and meditation can help decrease pain, stress, and anxiety  Relaxation and meditation also help regulate your breathing and decrease your blood pressure and heartbeat  What are some types of relaxation? Slow, deep breathing  can help relax your body and mind  Deep breathing can be done at any time  Progressive muscle relaxation  decreases tense muscles  With this therapy, you relax certain muscle groups until your entire body is relaxed  Start at one end of your body and move to the other end, such as from your feet to your head  Autogenic training, or self-control relaxation , helps increase the blood flow to your limbs and helps you sleep  With this therapy, you try to replace painful or uncomfortable thoughts and feelings with pleasant ones  Guided imagery  uses your imagination to help you feel peaceful and calm  You try to see, hear, smell, and taste things that you picture in your mind  For example, you might imagine lying on a beach, feeling the sand, and hearing the waves  Distraction  uses activities you enjoy to help you take your mind off of pain, stress, or anxiety  Distraction may include, listening to music, painting, reading a book, or exercise  What are some types of meditation? Meditation is a mind exercise that helps to relax your body and free your mind of worry  You sit quietly in a comfortable position, close your eyes, and relax your muscles  Your thoughts are relaxed while your body stays alert  Meditation can be done alone or with other people  Mantra meditation  is when you think or speak a certain word or phrase over and over  The word or phrase often has a smooth sound  The mantra is used as a way to help you focus  The sound is believed to make vibrations that have different effects on people      Mindfulness meditation  is when you focus on what is happening in your life at that point in time  You become aware of your thoughts and feelings in the present without making any judgment  You learn not to worry about your past and future  CARE AGREEMENT:   You have the right to help plan your care  Learn about your health condition and how it may be treated  Discuss treatment options with your healthcare providers to decide what care you want to receive  You always have the right to refuse treatment  The above information is an  only  It is not intended as medical advice for individual conditions or treatments  Talk to your doctor, nurse or pharmacist before following any medical regimen to see if it is safe and effective for you  © Copyright [employer] 2022 Information is for End User's use only and may not be sold, redistributed or otherwise used for commercial purposes   All illustrations and images included in CareNotes® are the copyrighted property of [employer]  or [address] [place], [place] [id number] rocael  [employer] Counseling:  I discussed with the patient the risks of mycophenolate mofetil including but not limited to infection/immunosuppression, GI upset, hypokalemia, hypercholesterolemia, bone marrow suppression, lymphoproliferative disorders, malignancy, GI ulceration/bleed/perforation, colitis, interstitial lung disease, kidney failure, progressive multifocal leukoencephalopathy, and birth defects.  The patient understands that monitoring is required including a baseline creatinine and regular CBC testing. In addition, patient must alert us immediately if symptoms of infection or other concerning signs are noted.

## 2022-10-17 LAB — COLOGUARD RESULT REPORTABLE: NEGATIVE

## 2022-10-18 ENCOUNTER — TELEPHONE (OUTPATIENT)
Dept: FAMILY MEDICINE CLINIC | Facility: CLINIC | Age: 52
End: 2022-10-18

## 2022-10-18 NOTE — TELEPHONE ENCOUNTER
Patient called for cologuard result   Told negative   She was unable to log into [facility] for results

## 2022-12-01 ENCOUNTER — OFFICE VISIT (OUTPATIENT)
Dept: FAMILY MEDICINE CLINIC | Facility: CLINIC | Age: 52
End: 2022-12-01

## 2022-12-01 VITALS
TEMPERATURE: 97 F | BODY MASS INDEX: 30.14 KG/M2 | DIASTOLIC BLOOD PRESSURE: 90 MMHG | SYSTOLIC BLOOD PRESSURE: 120 MMHG | OXYGEN SATURATION: 98 % | HEART RATE: 93 BPM | WEIGHT: 164.8 LBS

## 2022-12-01 DIAGNOSIS — Z20.822 SUSPECTED COVID-19 VIRUS INFECTION: Primary | ICD-10-CM

## 2022-12-01 NOTE — LETTER
December 1, 2022     Patient: Leah Baldwin  YOB: 1970  Date of Visit: 12/1/2022      To Whom it May Concern:    Real Monreal is under my professional care  Peewee Del Valle was seen in my office on 12/1/2022  Peewee Del Valle may return to work on post covid/flu results  If you have any questions or concerns, please don't hesitate to call           Sincerely,          BRITTANY Apple        CC:   No Recipients

## 2022-12-01 NOTE — PROGRESS NOTES
Subjective:   Chief Complaint   Patient presents with   • Nausea     Stomach cramps/pain for the past week  Lower left back pain  • COVID-19        Patient ID: Edwynjag Bowser is a 46 y o  female  Patient presents today with left sided back and side pain starting a week and a half ago  She complains of nausea, loss of appetite, diarrhea, cough, fatigue that started yesterday  Reports boyfriend having similar symptoms  Denies taking at home covid test and has not taken any medication to help relieve symptoms  The following portions of the patient's history were reviewed and updated as appropriate: allergies, current medications, past family history, past medical history, past social history, past surgical history and problem list     Review of Systems   Constitutional: Negative for chills, fatigue and fever  HENT: Positive for congestion  Negative for sinus pain, sneezing and sore throat  Respiratory: Negative for cough and shortness of breath  Cardiovascular: Negative for chest pain and palpitations  Gastrointestinal: Positive for diarrhea and nausea  Neurological: Negative for dizziness and light-headedness  Psychiatric/Behavioral: Negative for dysphoric mood  The patient is not nervous/anxious  Objective:  Vitals:    12/01/22 1332   BP: 120/90   BP Location: Left arm   Patient Position: Sitting   Cuff Size: Large   Pulse: 93   Temp: (!) 97 °F (36 1 °C)   TempSrc: Temporal   SpO2: 98%   Weight: 74 8 kg (164 lb 12 8 oz)      Physical Exam  Constitutional:       Appearance: Normal appearance  HENT:      Mouth/Throat:      Mouth: Mucous membranes are moist       Pharynx: Oropharynx is clear  Cardiovascular:      Rate and Rhythm: Normal rate and regular rhythm  Pulses: Normal pulses  Heart sounds: Normal heart sounds  Pulmonary:      Effort: Pulmonary effort is normal       Breath sounds: Normal breath sounds  Abdominal:      General: Abdomen is flat   Bowel sounds are normal  There is no distension  Palpations: Abdomen is soft  There is no mass  Tenderness: There is no abdominal tenderness  There is no right CVA tenderness, left CVA tenderness, guarding or rebound  Hernia: No hernia is present  Skin:     General: Skin is warm and dry  Capillary Refill: Capillary refill takes less than 2 seconds  Neurological:      Mental Status: She is alert and oriented to person, place, and time  Psychiatric:         Mood and Affect: Mood normal          Behavior: Behavior normal            Assessment/Plan:    No problem-specific Assessment & Plan notes found for this encounter         Diagnoses and all orders for this visit:    Suspected COVID-19 virus infection  -     Covid/Flu- Office Collect

## 2022-12-02 LAB
FLUAV RNA RESP QL NAA+PROBE: NEGATIVE
FLUBV RNA RESP QL NAA+PROBE: NEGATIVE
SARS-COV-2 RNA RESP QL NAA+PROBE: NEGATIVE

## 2023-01-03 ENCOUNTER — OFFICE VISIT (OUTPATIENT)
Dept: GYNECOLOGY | Facility: CLINIC | Age: 53
End: 2023-01-03

## 2023-01-03 VITALS
HEIGHT: 62 IN | DIASTOLIC BLOOD PRESSURE: 80 MMHG | WEIGHT: 164 LBS | SYSTOLIC BLOOD PRESSURE: 120 MMHG | BODY MASS INDEX: 30.18 KG/M2

## 2023-01-03 DIAGNOSIS — Z12.12 SCREENING FOR COLORECTAL CANCER: Primary | ICD-10-CM

## 2023-01-03 DIAGNOSIS — R31.29 OTHER MICROSCOPIC HEMATURIA: ICD-10-CM

## 2023-01-03 DIAGNOSIS — R10.2 PELVIC PAIN: ICD-10-CM

## 2023-01-03 DIAGNOSIS — Z87.442 HISTORY OF RENAL CALCULI: ICD-10-CM

## 2023-01-03 DIAGNOSIS — R31.0 GROSS HEMATURIA: ICD-10-CM

## 2023-01-03 DIAGNOSIS — Z12.11 SCREENING FOR COLORECTAL CANCER: Primary | ICD-10-CM

## 2023-01-03 LAB
SL AMB  POCT GLUCOSE, UA: NEGATIVE
SL AMB LEUKOCYTE ESTERASE,UA: NEGATIVE
SL AMB POCT BILIRUBIN,UA: NEGATIVE
SL AMB POCT BLOOD,UA: ABNORMAL
SL AMB POCT CLARITY,UA: ABNORMAL
SL AMB POCT COLOR,UA: YELLOW
SL AMB POCT KETONES,UA: ABNORMAL
SL AMB POCT NITRITE,UA: NEGATIVE
SL AMB POCT PH,UA: 5
SL AMB POCT SPECIFIC GRAVITY,UA: 1030
SL AMB POCT URINE PROTEIN: NEGATIVE
SL AMB POCT UROBILINOGEN: NORMAL

## 2023-01-03 NOTE — PROGRESS NOTES
51-year-old G4, P4  x4 presents to the office as a new patient complaining of lower back pain and pelvic pain for 6 weeks  Was seen by her family physician who ordered a pelvic ultrasound  She had a supracervical hysterectomy for endometriosis several years following her four C-sections  Patient had multiple back surgeries with hardware removal and spinal fusions  She states that the pain is not the same as her prior back pain  Pelvic ultrasound on 2022 showed a normal cervix the ovaries were nonvisualized  A small amount of pelvic fluid was noted  Patient describes the pain as radiating from her left side to the front  She has a history of renal stones  Denies any fevers or chills  Denies any dysuria or hematuria  Denies any dyspareunia  Denies any bowel problems  Never had a colonoscopy  UA dip large blood no leukocytes moderate ketones  Physical exam: Abdomen mild suprapubic tenderness  No rebound  External genitalia normal   Vagina clear  Cervix no lesions  No bleeding noted  No adnexal masses  Mild tenderness greater on the left side  Impression: Left pelvic pain x6 weeks  Normal pelvic ultrasound  Hematuria  Left renal stone  Plan: Patient to call family physician for possible CT scan  UA C&S  Increase hydration    Return to office 1 to 2 months for annual exam

## 2023-01-05 LAB — BACTERIA UR CULT: NORMAL

## 2023-01-12 ENCOUNTER — HOSPITAL ENCOUNTER (OUTPATIENT)
Dept: RADIOLOGY | Facility: MEDICAL CENTER | Age: 53
Discharge: HOME | End: 2023-01-12

## 2023-01-12 ENCOUNTER — HOSPITAL ENCOUNTER (OUTPATIENT)
Dept: CT IMAGING | Facility: HOSPITAL | Age: 53
Discharge: HOME | End: 2023-01-12

## 2023-01-12 DIAGNOSIS — R10.2 PELVIC PAIN: ICD-10-CM

## 2023-01-12 DIAGNOSIS — Z87.442 HISTORY OF RENAL CALCULI: ICD-10-CM

## 2023-01-12 DIAGNOSIS — R31.29 OTHER MICROSCOPIC HEMATURIA: ICD-10-CM

## 2023-01-19 ENCOUNTER — TELEPHONE (OUTPATIENT)
Dept: GYNECOLOGY | Facility: CLINIC | Age: 53
End: 2023-01-19

## 2023-01-19 NOTE — TELEPHONE ENCOUNTER
Pt called her CT scan results are in and she would like to know what the results are and what the next steps are  Please advise

## 2023-02-15 ENCOUNTER — APPOINTMENT (OUTPATIENT)
Dept: LAB | Facility: CLINIC | Age: 53
End: 2023-02-15

## 2023-02-15 DIAGNOSIS — N20.0 KIDNEY STONE: ICD-10-CM

## 2023-02-15 LAB
ANION GAP SERPL CALCULATED.3IONS-SCNC: 6 MMOL/L (ref 4–13)
BASOPHILS # BLD AUTO: 0.1 THOUSANDS/ÂΜL (ref 0–0.1)
BASOPHILS NFR BLD AUTO: 1 % (ref 0–1)
BUN SERPL-MCNC: 15 MG/DL (ref 5–25)
CALCIUM SERPL-MCNC: 10.1 MG/DL (ref 8.3–10.1)
CHLORIDE SERPL-SCNC: 108 MMOL/L (ref 96–108)
CO2 SERPL-SCNC: 25 MMOL/L (ref 21–32)
CREAT SERPL-MCNC: 0.76 MG/DL (ref 0.6–1.3)
EOSINOPHIL # BLD AUTO: 0.26 THOUSAND/ÂΜL (ref 0–0.61)
EOSINOPHIL NFR BLD AUTO: 3 % (ref 0–6)
ERYTHROCYTE [DISTWIDTH] IN BLOOD BY AUTOMATED COUNT: 12.2 % (ref 11.6–15.1)
GFR SERPL CREATININE-BSD FRML MDRD: 90 ML/MIN/1.73SQ M
GLUCOSE P FAST SERPL-MCNC: 136 MG/DL (ref 65–99)
HCT VFR BLD AUTO: 52.1 % (ref 34.8–46.1)
HGB BLD-MCNC: 16.6 G/DL (ref 11.5–15.4)
IMM GRANULOCYTES # BLD AUTO: 0.02 THOUSAND/UL (ref 0–0.2)
IMM GRANULOCYTES NFR BLD AUTO: 0 % (ref 0–2)
LYMPHOCYTES # BLD AUTO: 2.65 THOUSANDS/ÂΜL (ref 0.6–4.47)
LYMPHOCYTES NFR BLD AUTO: 33 % (ref 14–44)
MCH RBC QN AUTO: 31 PG (ref 26.8–34.3)
MCHC RBC AUTO-ENTMCNC: 31.9 G/DL (ref 31.4–37.4)
MCV RBC AUTO: 97 FL (ref 82–98)
MONOCYTES # BLD AUTO: 0.59 THOUSAND/ÂΜL (ref 0.17–1.22)
MONOCYTES NFR BLD AUTO: 7 % (ref 4–12)
NEUTROPHILS # BLD AUTO: 4.45 THOUSANDS/ÂΜL (ref 1.85–7.62)
NEUTS SEG NFR BLD AUTO: 56 % (ref 43–75)
NRBC BLD AUTO-RTO: 0 /100 WBCS
PLATELET # BLD AUTO: 299 THOUSANDS/UL (ref 149–390)
PMV BLD AUTO: 10.2 FL (ref 8.9–12.7)
POTASSIUM SERPL-SCNC: 4 MMOL/L (ref 3.5–5.3)
RBC # BLD AUTO: 5.36 MILLION/UL (ref 3.81–5.12)
SODIUM SERPL-SCNC: 139 MMOL/L (ref 135–147)
WBC # BLD AUTO: 8.07 THOUSAND/UL (ref 4.31–10.16)

## 2023-02-16 LAB — BACTERIA UR CULT: NORMAL

## 2024-03-06 DIAGNOSIS — Z00.6 ENCOUNTER FOR EXAMINATION FOR NORMAL COMPARISON OR CONTROL IN CLINICAL RESEARCH PROGRAM: ICD-10-CM
